# Patient Record
Sex: MALE | Race: WHITE | NOT HISPANIC OR LATINO
[De-identification: names, ages, dates, MRNs, and addresses within clinical notes are randomized per-mention and may not be internally consistent; named-entity substitution may affect disease eponyms.]

---

## 2018-12-10 PROBLEM — Z00.00 ENCOUNTER FOR PREVENTIVE HEALTH EXAMINATION: Status: ACTIVE | Noted: 2018-12-10

## 2018-12-28 ENCOUNTER — LABORATORY RESULT (OUTPATIENT)
Age: 43
End: 2018-12-28

## 2018-12-28 ENCOUNTER — APPOINTMENT (OUTPATIENT)
Dept: PULMONOLOGY | Facility: CLINIC | Age: 43
End: 2018-12-28
Payer: COMMERCIAL

## 2018-12-28 VITALS
OXYGEN SATURATION: 100 % | HEIGHT: 72 IN | HEART RATE: 64 BPM | RESPIRATION RATE: 17 BRPM | DIASTOLIC BLOOD PRESSURE: 65 MMHG | SYSTOLIC BLOOD PRESSURE: 110 MMHG | WEIGHT: 155 LBS | BODY MASS INDEX: 20.99 KG/M2

## 2018-12-28 DIAGNOSIS — R05 COUGH: ICD-10-CM

## 2018-12-28 DIAGNOSIS — R09.82 POSTNASAL DRIP: ICD-10-CM

## 2018-12-28 DIAGNOSIS — J30.2 OTHER SEASONAL ALLERGIC RHINITIS: ICD-10-CM

## 2018-12-28 DIAGNOSIS — Z78.9 OTHER SPECIFIED HEALTH STATUS: ICD-10-CM

## 2018-12-28 DIAGNOSIS — J30.9 ALLERGIC RHINITIS, UNSPECIFIED: ICD-10-CM

## 2018-12-28 DIAGNOSIS — Z86.39 PERSONAL HISTORY OF OTHER ENDOCRINE, NUTRITIONAL AND METABOLIC DISEASE: ICD-10-CM

## 2018-12-28 DIAGNOSIS — R06.02 SHORTNESS OF BREATH: ICD-10-CM

## 2018-12-28 DIAGNOSIS — Z80.0 FAMILY HISTORY OF MALIGNANT NEOPLASM OF DIGESTIVE ORGANS: ICD-10-CM

## 2018-12-28 DIAGNOSIS — Z83.79 FAMILY HISTORY OF OTHER DISEASES OF THE DIGESTIVE SYSTEM: ICD-10-CM

## 2018-12-28 DIAGNOSIS — K21.9 GASTRO-ESOPHAGEAL REFLUX DISEASE W/OUT ESOPHAGITIS: ICD-10-CM

## 2018-12-28 LAB
BASOPHILS # BLD AUTO: 0.02 K/UL
BASOPHILS NFR BLD AUTO: 0.3 %
EOSINOPHIL # BLD AUTO: 0.23 K/UL
EOSINOPHIL NFR BLD AUTO: 3.5 %
HCT VFR BLD CALC: 39.6 %
HGB BLD-MCNC: 12.5 G/DL
IMM GRANULOCYTES NFR BLD AUTO: 0.2 %
LYMPHOCYTES # BLD AUTO: 2 K/UL
LYMPHOCYTES NFR BLD AUTO: 30.3 %
MAN DIFF?: NORMAL
MCHC RBC-ENTMCNC: 26.8 PG
MCHC RBC-ENTMCNC: 31.6 GM/DL
MCV RBC AUTO: 85 FL
MONOCYTES # BLD AUTO: 0.68 K/UL
MONOCYTES NFR BLD AUTO: 10.3 %
NEUTROPHILS # BLD AUTO: 3.65 K/UL
NEUTROPHILS NFR BLD AUTO: 55.4 %
PLATELET # BLD AUTO: 288 K/UL
RBC # BLD: 4.66 M/UL
RBC # FLD: 15.7 %
WBC # FLD AUTO: 6.59 K/UL

## 2018-12-28 PROCEDURE — 94727 GAS DIL/WSHOT DETER LNG VOL: CPT

## 2018-12-28 PROCEDURE — 94729 DIFFUSING CAPACITY: CPT

## 2018-12-28 PROCEDURE — 99204 OFFICE O/P NEW MOD 45 MIN: CPT | Mod: 25

## 2018-12-28 PROCEDURE — 94010 BREATHING CAPACITY TEST: CPT

## 2018-12-28 PROCEDURE — 71046 X-RAY EXAM CHEST 2 VIEWS: CPT

## 2018-12-28 RX ORDER — ALBUTEROL SULFATE 90 UG/1
108 (90 BASE) AEROSOL, METERED RESPIRATORY (INHALATION) EVERY 6 HOURS
Qty: 1 | Refills: 3 | Status: ACTIVE | COMMUNITY
Start: 2018-12-28 | End: 1900-01-01

## 2018-12-28 RX ORDER — FLUTICASONE FUROATE AND VILANTEROL TRIFENATATE 200; 25 UG/1; UG/1
200-25 POWDER RESPIRATORY (INHALATION) DAILY
Qty: 1 | Refills: 2 | Status: ACTIVE | COMMUNITY
Start: 2018-12-28 | End: 1900-01-01

## 2018-12-28 RX ORDER — LEVOCETIRIZINE DIHYDROCHLORIDE 5 MG/1
5 TABLET ORAL
Qty: 30 | Refills: 0 | Status: ACTIVE | COMMUNITY
Start: 2018-12-14

## 2018-12-28 NOTE — PHYSICAL EXAM
[General Appearance - Well Developed] : well developed [Normal Appearance] : normal appearance [Well Groomed] : well groomed [General Appearance - Well Nourished] : well nourished [No Deformities] : no deformities [General Appearance - In No Acute Distress] : no acute distress [Normal Conjunctiva] : the conjunctiva exhibited no abnormalities [Eyelids - No Xanthelasma] : the eyelids demonstrated no xanthelasmas [Normal Oropharynx] : normal oropharynx [Neck Appearance] : the appearance of the neck was normal [Neck Cervical Mass (___cm)] : no neck mass was observed [Jugular Venous Distention Increased] : there was no jugular-venous distention [Thyroid Diffuse Enlargement] : the thyroid was not enlarged [Thyroid Nodule] : there were no palpable thyroid nodules [Heart Rate And Rhythm] : heart rate and rhythm were normal [Heart Sounds] : normal S1 and S2 [Murmurs] : no murmurs present [Respiration, Rhythm And Depth] : normal respiratory rhythm and effort [Exaggerated Use Of Accessory Muscles For Inspiration] : no accessory muscle use [Auscultation Breath Sounds / Voice Sounds] : lungs were clear to auscultation bilaterally [Abdomen Soft] : soft [Abdomen Tenderness] : non-tender [Abdomen Mass (___ Cm)] : no abdominal mass palpated [Abnormal Walk] : normal gait [Gait - Sufficient For Exercise Testing] : the gait was sufficient for exercise testing [Nail Clubbing] : no clubbing of the fingernails [Cyanosis, Localized] : no localized cyanosis [Petechial Hemorrhages (___cm)] : no petechial hemorrhages [Skin Color & Pigmentation] : normal skin color and pigmentation [Skin Turgor] : normal skin turgor [] : no rash [Deep Tendon Reflexes (DTR)] : deep tendon reflexes were 2+ and symmetric [Sensation] : the sensory exam was normal to light touch and pinprick [No Focal Deficits] : no focal deficits [Oriented To Time, Place, And Person] : oriented to person, place, and time [Impaired Insight] : insight and judgment were intact [Affect] : the affect was normal [II] : II [FreeTextEntry1] : I:E 1:3, clear

## 2018-12-28 NOTE — ASSESSMENT
[FreeTextEntry1] : Mr. Madrigal is a 43 year old male with a prior history of seasonal allergies, mild GERD who now comes in for a pulmonary evaluation.\par \par The patient's SOB is felt to be multifactorial:\par - Poor breathing mechanics\par - Mild, Intermittent Asthma\par \par DDx Cough\par - Asthma (Post URI Bronchospasm)\par - Secondary Reflux\par - PND syndrome \par \par Problem 1: Post URI Bronchospasm\par - Add Breo Ellipta 200 1 inhalatin QD\par - Add Ventolin 2 puffs prn, pre-exercise\par -Inhaler technique reviewed as well as oral hygiene techniques reviewed with patient. Avoidance of cold air, extremes of temperature, rescue inhaler should be used before exercise. Order of medication reviewed with patient. Recommended use of a cool mist humidifier in the bedroom. \par \par Problem 2: Allergy/ PND Syndrome \par - Recommended Xlear Natural Saline \par - Script given for blood work: asthma profile, food IgE panel, eosinophil level, IgE level, Vitamin D level \par - Environmental measures for allergies were encouraged including mattress and pillow cover, air purifier, and environmental controls.\par \par Problem 3: Secondary Reflux (Mild GERD)\par - Things to avoid including overeating, spicy foods, tight clothing, eating within three hours of bed, this list is not all inclusive. \par - For treatment of reflux, possible options discussed including diet control, H2 blockers, PPIs, as well as coating motility agents discussed as treatment options. Timing of meals and proximity of last meal to sleep were discussed. If symptoms persist, a formal gastrointestinal evaluation is needed.\par \par Problem 4: Poor Breathing Mechanics \par - Proper breathing techniques were reviewed with an emphasis of exhalation. Patient instructed to breath in for 1 second and out for four seconds. Patient was encouraged to not talk while walking.\par \par Problem 5: Health maintenance \par -s/p flu shot \par -recommended strep pneumonia vaccines: Prevnar-13 vaccine, followed by Pneumo vaccine 23 one year following\par -recommended early intervention for URIs\par -recommended regular osteoporosis evaluations\par -recommended early dermatological evaluations\par -recommended after the age of 50 to the age of 70, colonoscopy every 5 years\par \par \par f/u in 6-8 weeks\par pt is encouraged to call or fax the office with any questions or concerns. \par Explained to the pt in full detail with demonstrations how to use the inhalers and inhaler hygiene. \par -Education provided to the PT regarding their visit and conditions.

## 2018-12-28 NOTE — PROCEDURE
[FreeTextEntry1] : CXR revealed a normal sized heart; there was no evidence of infiltrate or effusion-- A normal chest radiograph. \par \par PFT- spi reveals normal flows; FEV1 was 3.87L which is 101% of predicted; normal lung volumes; normal diffusion at 28.7, which is 114% of predicted; normal flow volume loop

## 2018-12-28 NOTE — ADDENDUM
[FreeTextEntry1] : Documented by Abraham Berry acting as a scribe for Dr. Kong Torres on 12/28/18\par \par All medical record entries made by the Scribe were at my, Dr. Kong Torres's, direction and personally dictated by me on 12/28/18. I have reviewed the chart and agree that the record accurately reflects my personal performance of the history, physical exam, assessment and plan. I have also personally directed, reviewed, and agree with the discharge instructions. \par \par \par \par \par

## 2018-12-28 NOTE — HISTORY OF PRESENT ILLNESS
[FreeTextEntry1] : Mr. Madrigal is a 43 year old male coming into the office today for an initial evaluation. His chief complaint is cough. \par - He comes in stating that he developed a cold about 1-1.5 month ago. \par - He was placed on an Abx for his cold\par - He states that his congestion and sinus-related symptoms resolved, but he continues to have a persistent, intermittent cough, which he describes as "not deep"\par - As of a few days ago, he developed another cold\par - When he first developed his cough, he had hoarseness for about a week\par - He does note having had PND. \par - He has seasonal allergies late summer early fall. \par - He states that he has never had a cold that has persisted for this amount of time. \par - He states that he has a history of developing a cough if exercising in the cold air. \par - He states that he sometimes becomes SOB while climbing stairs\par - His weight is stable\par - He experiences occasional reflux, which he teats with Tums. \par - He had an endoscopy performed in the last few months, the results of which were normal. \par - His energy level is fine\par - He states that he does not get enough sleep, but he does not state that he feels tired in the morning\par - He denies snoring\par - In the last few months, he has developed right shoulder pain.\par - He notes a prior shoulder injury from a fall \par - He likes to run. \par - His bowels are regular\par - He denies any headaches, nausea, vomiting, fever, chills, sweats, chest pain, chest pressure, palpitations, wheezing, fatigue, diarrhea, constipation, dysphagia, myalgias, dizziness, leg swelling, leg pain, itchy eyes, itchy ears, or sour taste in the mouth.

## 2018-12-30 LAB
24R-OH-CALCIDIOL SERPL-MCNC: 63.6 PG/ML
25(OH)D3 SERPL-MCNC: 38 NG/ML
IGE SER-MCNC: 43 IU/ML

## 2019-01-02 ENCOUNTER — RESULT REVIEW (OUTPATIENT)
Age: 44
End: 2019-01-02

## 2019-01-03 LAB
A ALTERNATA IGE QN: <0.1 KUA/L
A FUMIGATUS IGE QN: <0.1 KUA/L
C ALBICANS IGE QN: 0.25 KUA/L
C HERBARUM IGE QN: <0.1 KUA/L
CAT DANDER IGE QN: <0.1 KUA/L
CLAM IGE QN: <0.1 KUA/L
CODFISH IGE QN: <0.1 KUA/L
COMMON RAGWEED IGE QN: 16.9 KUA/L
CORN IGE QN: <0.1 KUA/L
COW MILK IGE QN: <0.1 KUA/L
D FARINAE IGE QN: 0.14 KUA/L
D PTERONYSS IGE QN: <0.1 KUA/L
DEPRECATED A ALTERNATA IGE RAST QL: 0
DEPRECATED A FUMIGATUS IGE RAST QL: 0
DEPRECATED C ALBICANS IGE RAST QL: NORMAL
DEPRECATED C HERBARUM IGE RAST QL: 0
DEPRECATED CAT DANDER IGE RAST QL: 0
DEPRECATED CLAM IGE RAST QL: 0
DEPRECATED CODFISH IGE RAST QL: 0
DEPRECATED COMMON RAGWEED IGE RAST QL: ABNORMAL
DEPRECATED CORN IGE RAST QL: 0
DEPRECATED COW MILK IGE RAST QL: 0
DEPRECATED D FARINAE IGE RAST QL: NORMAL
DEPRECATED D PTERONYSS IGE RAST QL: 0
DEPRECATED DOG DANDER IGE RAST QL: 0
DEPRECATED EGG WHITE IGE RAST QL: 0
DEPRECATED M RACEMOSUS IGE RAST QL: 0
DEPRECATED PEANUT IGE RAST QL: 0
DEPRECATED ROACH IGE RAST QL: 0
DEPRECATED SCALLOP IGE RAST QL: <0.1 KUA/L
DEPRECATED SESAME SEED IGE RAST QL: 0
DEPRECATED SHRIMP IGE RAST QL: 0
DEPRECATED SOYBEAN IGE RAST QL: 0
DEPRECATED TIMOTHY IGE RAST QL: 0
DEPRECATED WALNUT IGE RAST QL: 0
DEPRECATED WHEAT IGE RAST QL: 0
DEPRECATED WHITE OAK IGE RAST QL: 0
DOG DANDER IGE QN: <0.1 KUA/L
EGG WHITE IGE QN: <0.1 KUA/L
M RACEMOSUS IGE QN: <0.1 KUA/L
PEANUT IGE QN: <0.1 KUA/L
ROACH IGE QN: <0.1 KUA/L
SCALLOP IGE QN: 0
SCALLOP IGE QN: <0.1 KUA/L
SESAME SEED IGE QN: <0.1 KUA/L
SOYBEAN IGE QN: <0.1 KUA/L
TIMOTHY IGE QN: <0.1 KUA/L
WALNUT IGE QN: <0.1 KUA/L
WHEAT IGE QN: <0.1 KUA/L
WHITE OAK IGE QN: <0.1 KUA/L

## 2019-02-04 ENCOUNTER — APPOINTMENT (OUTPATIENT)
Dept: PULMONOLOGY | Facility: CLINIC | Age: 44
End: 2019-02-04

## 2019-02-18 ENCOUNTER — CHART COPY (OUTPATIENT)
Age: 44
End: 2019-02-18

## 2022-09-09 ENCOUNTER — INPATIENT (INPATIENT)
Facility: HOSPITAL | Age: 47
LOS: 8 days | Discharge: HOME | DRG: 456 | End: 2022-09-18
Attending: INTERNAL MEDICINE | Admitting: HOSPITALIST
Payer: COMMERCIAL

## 2022-09-09 VITALS
RESPIRATION RATE: 18 BRPM | HEART RATE: 101 BPM | DIASTOLIC BLOOD PRESSURE: 70 MMHG | TEMPERATURE: 98 F | SYSTOLIC BLOOD PRESSURE: 90 MMHG

## 2022-09-09 DIAGNOSIS — M46.20 OSTEOMYELITIS OF VERTEBRA, SITE UNSPECIFIED: ICD-10-CM

## 2022-09-09 LAB
HCT VFR BLD CALC: 37.6 % — LOW (ref 39–50)
HGB BLD-MCNC: 11.9 G/DL — LOW (ref 13–17)
MCHC RBC-ENTMCNC: 28 PG — SIGNIFICANT CHANGE UP (ref 27–34)
MCHC RBC-ENTMCNC: 31.6 GM/DL — LOW (ref 32–36)
MCV RBC AUTO: 88.5 FL — SIGNIFICANT CHANGE UP (ref 80–100)
NRBC # BLD: 0 /100 WBCS — SIGNIFICANT CHANGE UP (ref 0–0)
PLATELET # BLD AUTO: 408 K/UL — HIGH (ref 150–400)
RBC # BLD: 4.25 M/UL — SIGNIFICANT CHANGE UP (ref 4.2–5.8)
RBC # FLD: 12.7 % — SIGNIFICANT CHANGE UP (ref 10.3–14.5)
WBC # BLD: 9.68 K/UL — SIGNIFICANT CHANGE UP (ref 3.8–10.5)
WBC # FLD AUTO: 9.68 K/UL — SIGNIFICANT CHANGE UP (ref 3.8–10.5)

## 2022-09-09 PROCEDURE — 93010 ELECTROCARDIOGRAM REPORT: CPT | Mod: 59

## 2022-09-09 PROCEDURE — 99285 EMERGENCY DEPT VISIT HI MDM: CPT

## 2022-09-09 PROCEDURE — 99222 1ST HOSP IP/OBS MODERATE 55: CPT

## 2022-09-09 NOTE — ED PROVIDER NOTE - OBJECTIVE STATEMENT
46F with 46F with no significant PMH who presents with epidural abscess. Patient was in his usual state of health until around 3.5 weeks ago when he presented to [facility] with syncope 46F with no significant PMH who presents with epidural abscess. Patient was in his usual state of health until around 3.5 weeks ago when he presented to [facility] with syncope, found to have MSSA bacteremia was given 2 days of IV vanc and started on IV daptomycin since Aug 17th. Follows with ID Dr. Krause  [phone] who reports that around a week ago patient started experiencing back and rib tightness and rising ESR/CRP to 60s and 30s respectively. Received a CTA and found to have developing paraspinal abscess at ventral margin of T8-T9 disc space and presented to ED for further evaluation. 46F with no significant PMH who presents with epidural abscess. Patient was in his usual state of health until around 3.5 weeks ago when he presented to [facility] with syncope, found to have MSSA bacteremia was given 2 days of IV vanc and started on IV daptomycin since Aug 17th. Follows with ID Dr. Krause  [phone] who reports that around a week ago patient started experiencing back and rib tightness and rising ESR/CRP to 60s and 30s respectively. Received a CTA and found to have developing paraspinal abscess at ventral margin of T8-T9 disc space and presented to ED for further evaluation.     Attending note.  Patient was seen in room #22 right.  Agree with above.  Patient was diagnosed by CT angio of a paraspinal abscess on the ventral margin of T8 and T9.  He denies any neurologic symptoms.  He has no bowel or bladder dysfunction, numbness or paresthesia or weakness.  Reports pain in the back and around the chest anteriorly which is worse with movement and deep inspiration.  He denies any fever.

## 2022-09-09 NOTE — CONSULT NOTE ADULT - TIME BILLING
Please note that over 50 minutes of time was spent in care of this patient including  - pre visit preparation  - in person visit  - post visit documentation  - review of imaging  - discussion with colleagues

## 2022-09-09 NOTE — ED PROVIDER NOTE - PROGRESS NOTE DETAILS
called ortho-spine and ordered MR c-spine, thoracic and lumbar spine with and wo contrast consulted ID per hospital medicine request, fellow on call Dr. Yanes recommended continuing IV daptomycin, source control, formal consult to follow in morning

## 2022-09-09 NOTE — ED PROVIDER NOTE - CLINICAL SUMMARY MEDICAL DECISION MAKING FREE TEXT BOX
46M with recent hx of MSSA bacteremia on IV daptomycin since Aug 17 who presents with new paraspinal abscess at T8-T9. Will consult ortho-spine and order MRI c-spine, thoracic, and lumbar spine. 46M with recent hx of MSSA bacteremia on IV daptomycin since Aug 17 who presents with new paraspinal abscess at T8-T9. Will consult ortho-spine and order MRI c-spine, thoracic, and lumbar spine.     Attn - Paraspinal abscess seen on CT angio.  Neurosurgical consultation, labs, cultures, admit.

## 2022-09-09 NOTE — CONSULT NOTE ADULT - ATTENDING COMMENTS
This is a 46 year old male with ~1M of mid back pain and bacteremia. He also describes radiating pain across back. Imaging shows progression of destructive process at T8-9. He is NV intact with no UMN signs. We will need MRI C/T/L spine w and wo contrast for full evaluation of this area. Will try to get this completed ASAP

## 2022-09-09 NOTE — ED PROVIDER NOTE - PHYSICAL EXAMINATION
Attn - alert, NAD, no pallor or jaundice, PERRL 3 mm, moist mm, skin - warm and dry, Lungs - clear, no w/r/r, good BS bilaterally, Cor - rr, no M, no rub, Abdo - ND, soft, NT, no HSM, no CVAT, no guarding or rebound. Back - nontender to percussion,  Extremities - no edema, no calf tenderness, distal pulses intact and symmetrical, Neuro - intact and non-focal

## 2022-09-09 NOTE — CONSULT NOTE ADULT - SUBJECTIVE AND OBJECTIVE BOX
[facility]    Patient is a 46yMale who presents to [facility] ED w/ a c/o of Thoracic back pain for 3 weeks. Patient states hx of recent MRSA bacteremia being treated with Daptomycin outpatient until 9/17/22. He states he has been having thoracic back pain since that time. Had fevers/chills which have resolved since antibiotics started. Vague back pain remains but he is able to ambulate without any radicular pains or weakness. No bowel or bladder symptoms or saddle anesthesia. He had an outpatient MRI T Spine w contrast 8/22/22 which showed T8-9 HNP and CT Chest w/ contrast shows new T8-9 epidural abscess with lytic changes. Denies any numbness or tingling. Denies having any other pain elsewhere. No other orthopedic concerns at this time.      No Known Allergies      PHYSICAL EXAM:  T(C): 37.1 (09-09-22 @ 20:20), Max: 37.1 (09-09-22 @ 20:20)  HR: 98 (09-09-22 @ 20:20) (98 - 101)  BP: 106/70 (09-09-22 @ 20:20) (90/70 - 106/70)  RR: 18 (09-09-22 @ 20:20) (18 - 18)  SpO2: 97% (09-09-22 @ 20:20) (97% - 97%)      GEN: NAD, AAOx3    SPINE:  Skin intact  NTTP over the bony prominences of the cervical // thoracic // lumbar // sacral spine  No bony step-offs  Grossly moving all extremities  + Median/Radial/Ulnar/Musculocutaneous/Axillary nerves intact  + Hip Flexors/Quads/Hamstrings/TA/EHL/FHL/GS  SILT C5-T1  SILT L2-S1  + Radial Pulse  + DP/PT Pulses  No saddle anesthesia  + rectal tone      Motor:                          Deltoid       Biceps      Triceps      Wrist Ext      Finger Flex    Finger Abduction   RIGHT             5/5             5/5             5/5             5/5                 5/5                     5/5  LEFT                5/5             5/5             5/5             5/5                 5/5                     5/5                          Hip Flex       Knee Ext        Knee Flex     Dorsiflex      Hallux Ext        PlantarFlex  RIGHT            5/5                5/5                 5/5               5/5                 5/5                    5/5  LEFT               5/5                5/5                 5/5               5/5                 5/5                    5/5      Sensory:                      C5      C6      C7      C8       T1          RIGHT          2         2        2         2         2          (0=absent, 1=impaired, 2=normal, NT=not testable)  LEFT             2         2        2         2         2          (0=absent, 1=impaired, 2=normal, NT=not testable)                        L2        L3       L4      L5       S1          RIGHT        2          2         2        2        2           (0=absent, 1=impaired, 2=normal, NT=not testable)  LEFT           2          2         2        2        2           (0=absent, 1=impaired, 2=normal, NT=not testable)    Negative Hoang's sign bilaterally  Negative Babinski bilaterally   Negative Myoclonus bilaterally        Secondary Survey:   No TTP over bony prominences, SILT, palpable pulses, full/painless A/PROM, compartments soft. No TTP over spinous processes or paraspinal muscles at C/T/L spine. No palpable step off. No other injuries or complaints.      A/P: 46M w/ T8-9 changes on outpatient CT chest consistent with epidural abscess, OM    Imaging reviewed  Recommend MRI CTL Spine w/wo con to r/o skip lesions  ID C/s   Analgesia  WBAT  DVT ppx per primary  PT/OT   Further ortho spine recs pending MR Imaging

## 2022-09-09 NOTE — ED ADULT NURSE NOTE - OBJECTIVE STATEMENT
pt had ct today and revealed Epidural spinal abscess at T8T9;  pt is being treated for bacteremia since August following a syncopal episode; received daily iv antibiotics peripheral; c/o back and rib pain;; pt had ct today and revealed Epidural spinal abscess at T8T9;  pt is being treated for bacteremia since August following a syncopal episode; received daily iv antibiotics peripheral (Daptomycin daily at 0900 at outpt clinic); c/o back and rib pain; pt sent here for MRI

## 2022-09-10 ENCOUNTER — TRANSCRIPTION ENCOUNTER (OUTPATIENT)
Age: 47
End: 2022-09-10

## 2022-09-10 DIAGNOSIS — D64.9 ANEMIA, UNSPECIFIED: ICD-10-CM

## 2022-09-10 DIAGNOSIS — M46.20 OSTEOMYELITIS OF VERTEBRA, SITE UNSPECIFIED: ICD-10-CM

## 2022-09-10 DIAGNOSIS — Z87.898 PERSONAL HISTORY OF OTHER SPECIFIED CONDITIONS: ICD-10-CM

## 2022-09-10 DIAGNOSIS — Z01.818 ENCOUNTER FOR OTHER PREPROCEDURAL EXAMINATION: ICD-10-CM

## 2022-09-10 DIAGNOSIS — R63.8 OTHER SYMPTOMS AND SIGNS CONCERNING FOOD AND FLUID INTAKE: ICD-10-CM

## 2022-09-10 LAB
ALBUMIN SERPL ELPH-MCNC: 3.7 G/DL — SIGNIFICANT CHANGE UP (ref 3.3–5)
ALP SERPL-CCNC: 98 U/L — SIGNIFICANT CHANGE UP (ref 40–120)
ALT FLD-CCNC: 20 U/L — SIGNIFICANT CHANGE UP (ref 10–45)
ANION GAP SERPL CALC-SCNC: 10 MMOL/L — SIGNIFICANT CHANGE UP (ref 5–17)
ANION GAP SERPL CALC-SCNC: 12 MMOL/L — SIGNIFICANT CHANGE UP (ref 5–17)
APTT BLD: 29.7 SEC — SIGNIFICANT CHANGE UP (ref 27.5–35.5)
AST SERPL-CCNC: 17 U/L — SIGNIFICANT CHANGE UP (ref 10–40)
BILIRUB SERPL-MCNC: 0.5 MG/DL — SIGNIFICANT CHANGE UP (ref 0.2–1.2)
BLD GP AB SCN SERPL QL: NEGATIVE — SIGNIFICANT CHANGE UP
BUN SERPL-MCNC: 15 MG/DL — SIGNIFICANT CHANGE UP (ref 7–23)
BUN SERPL-MCNC: 17 MG/DL — SIGNIFICANT CHANGE UP (ref 7–23)
CALCIUM SERPL-MCNC: 9.3 MG/DL — SIGNIFICANT CHANGE UP (ref 8.4–10.5)
CALCIUM SERPL-MCNC: 9.8 MG/DL — SIGNIFICANT CHANGE UP (ref 8.4–10.5)
CHLORIDE SERPL-SCNC: 101 MMOL/L — SIGNIFICANT CHANGE UP (ref 96–108)
CHLORIDE SERPL-SCNC: 104 MMOL/L — SIGNIFICANT CHANGE UP (ref 96–108)
CK SERPL-CCNC: 52 U/L — SIGNIFICANT CHANGE UP (ref 30–200)
CO2 SERPL-SCNC: 24 MMOL/L — SIGNIFICANT CHANGE UP (ref 22–31)
CO2 SERPL-SCNC: 26 MMOL/L — SIGNIFICANT CHANGE UP (ref 22–31)
CREAT SERPL-MCNC: 0.84 MG/DL — SIGNIFICANT CHANGE UP (ref 0.5–1.3)
CREAT SERPL-MCNC: 0.92 MG/DL — SIGNIFICANT CHANGE UP (ref 0.5–1.3)
CRP SERPL-MCNC: 40 MG/L — HIGH (ref 0–4)
EGFR: 103 ML/MIN/1.73M2 — SIGNIFICANT CHANGE UP
EGFR: 108 ML/MIN/1.73M2 — SIGNIFICANT CHANGE UP
ERYTHROCYTE [SEDIMENTATION RATE] IN BLOOD: 84 MM/HR — HIGH (ref 0–15)
FLUAV AG NPH QL: SIGNIFICANT CHANGE UP
FLUBV AG NPH QL: SIGNIFICANT CHANGE UP
GLUCOSE SERPL-MCNC: 91 MG/DL — SIGNIFICANT CHANGE UP (ref 70–99)
GLUCOSE SERPL-MCNC: 94 MG/DL — SIGNIFICANT CHANGE UP (ref 70–99)
HCT VFR BLD CALC: 34.6 % — LOW (ref 39–50)
HGB BLD-MCNC: 11.2 G/DL — LOW (ref 13–17)
INR BLD: 1.23 RATIO — HIGH (ref 0.88–1.16)
MAGNESIUM SERPL-MCNC: 2.1 MG/DL — SIGNIFICANT CHANGE UP (ref 1.6–2.6)
MCHC RBC-ENTMCNC: 28.4 PG — SIGNIFICANT CHANGE UP (ref 27–34)
MCHC RBC-ENTMCNC: 32.4 GM/DL — SIGNIFICANT CHANGE UP (ref 32–36)
MCV RBC AUTO: 87.8 FL — SIGNIFICANT CHANGE UP (ref 80–100)
NRBC # BLD: 0 /100 WBCS — SIGNIFICANT CHANGE UP (ref 0–0)
PHOSPHATE SERPL-MCNC: 3.2 MG/DL — SIGNIFICANT CHANGE UP (ref 2.5–4.5)
PLATELET # BLD AUTO: 318 K/UL — SIGNIFICANT CHANGE UP (ref 150–400)
POTASSIUM SERPL-MCNC: 3.6 MMOL/L — SIGNIFICANT CHANGE UP (ref 3.5–5.3)
POTASSIUM SERPL-MCNC: 3.9 MMOL/L — SIGNIFICANT CHANGE UP (ref 3.5–5.3)
POTASSIUM SERPL-SCNC: 3.6 MMOL/L — SIGNIFICANT CHANGE UP (ref 3.5–5.3)
POTASSIUM SERPL-SCNC: 3.9 MMOL/L — SIGNIFICANT CHANGE UP (ref 3.5–5.3)
PROT SERPL-MCNC: 7.3 G/DL — SIGNIFICANT CHANGE UP (ref 6–8.3)
PROTHROM AB SERPL-ACNC: 14.3 SEC — HIGH (ref 10.5–13.4)
RBC # BLD: 3.94 M/UL — LOW (ref 4.2–5.8)
RBC # FLD: 12.6 % — SIGNIFICANT CHANGE UP (ref 10.3–14.5)
RH IG SCN BLD-IMP: POSITIVE — SIGNIFICANT CHANGE UP
RSV RNA NPH QL NAA+NON-PROBE: SIGNIFICANT CHANGE UP
SARS-COV-2 RNA SPEC QL NAA+PROBE: SIGNIFICANT CHANGE UP
SODIUM SERPL-SCNC: 138 MMOL/L — SIGNIFICANT CHANGE UP (ref 135–145)
SODIUM SERPL-SCNC: 139 MMOL/L — SIGNIFICANT CHANGE UP (ref 135–145)
WBC # BLD: 7.85 K/UL — SIGNIFICANT CHANGE UP (ref 3.8–10.5)
WBC # FLD AUTO: 7.85 K/UL — SIGNIFICANT CHANGE UP (ref 3.8–10.5)

## 2022-09-10 PROCEDURE — 72157 MRI CHEST SPINE W/O & W/DYE: CPT | Mod: 26

## 2022-09-10 PROCEDURE — 99223 1ST HOSP IP/OBS HIGH 75: CPT

## 2022-09-10 PROCEDURE — 71045 X-RAY EXAM CHEST 1 VIEW: CPT | Mod: 26

## 2022-09-10 PROCEDURE — 72156 MRI NECK SPINE W/O & W/DYE: CPT | Mod: 26

## 2022-09-10 PROCEDURE — 72158 MRI LUMBAR SPINE W/O & W/DYE: CPT | Mod: 26

## 2022-09-10 RX ORDER — CYCLOBENZAPRINE HYDROCHLORIDE 10 MG/1
10 TABLET, FILM COATED ORAL AT BEDTIME
Refills: 0 | Status: DISCONTINUED | OUTPATIENT
Start: 2022-09-10 | End: 2022-09-11

## 2022-09-10 RX ORDER — DAPTOMYCIN 500 MG/10ML
700 INJECTION, POWDER, LYOPHILIZED, FOR SOLUTION INTRAVENOUS EVERY 24 HOURS
Refills: 0 | Status: DISCONTINUED | OUTPATIENT
Start: 2022-09-10 | End: 2022-09-11

## 2022-09-10 RX ORDER — DAPTOMYCIN 500 MG/10ML
500 INJECTION, POWDER, LYOPHILIZED, FOR SOLUTION INTRAVENOUS EVERY 24 HOURS
Refills: 0 | Status: DISCONTINUED | OUTPATIENT
Start: 2022-09-10 | End: 2022-09-10

## 2022-09-10 RX ORDER — ENOXAPARIN SODIUM 100 MG/ML
40 INJECTION SUBCUTANEOUS EVERY 24 HOURS
Refills: 0 | Status: DISCONTINUED | OUTPATIENT
Start: 2022-09-10 | End: 2022-09-10

## 2022-09-10 RX ORDER — ACETAMINOPHEN 500 MG
650 TABLET ORAL EVERY 6 HOURS
Refills: 0 | Status: DISCONTINUED | OUTPATIENT
Start: 2022-09-10 | End: 2022-09-11

## 2022-09-10 RX ORDER — SODIUM CHLORIDE 9 MG/ML
1000 INJECTION INTRAMUSCULAR; INTRAVENOUS; SUBCUTANEOUS
Refills: 0 | Status: DISCONTINUED | OUTPATIENT
Start: 2022-09-10 | End: 2022-09-11

## 2022-09-10 RX ADMIN — CYCLOBENZAPRINE HYDROCHLORIDE 10 MILLIGRAM(S): 10 TABLET, FILM COATED ORAL at 22:05

## 2022-09-10 RX ADMIN — SODIUM CHLORIDE 75 MILLILITER(S): 9 INJECTION INTRAMUSCULAR; INTRAVENOUS; SUBCUTANEOUS at 19:31

## 2022-09-10 RX ADMIN — DAPTOMYCIN 120 MILLIGRAM(S): 500 INJECTION, POWDER, LYOPHILIZED, FOR SOLUTION INTRAVENOUS at 10:24

## 2022-09-10 NOTE — H&P ADULT - PROBLEM SELECTOR PLAN 2
MSSA bacteremia tx at [facility]   TTE negative, no clear source at that time, now currently tx for paraspinal abscess  -Continue IV daptomycin 500 mg daily   -ID consult in AM   -F/u blood cultures

## 2022-09-10 NOTE — CONSULT NOTE ADULT - ASSESSMENT
46 year old M with MSSA bacteremia who presented to the ED with back pain for the last week or so, was found to have paraspinal T8/T9 abscess on outpatient CT.     Presented to Health system in August, pt was found to have (likely) MRSA bacteremia. TTE per report was negative. No source was found. Pt was discharged on a 4 week course of Daptomycin 500mg IV QD via PICC through 9/17.     Since last week, pt had been having back pain. Saw ID outpatient, pt was found to have elevated ESR and CRP and CT was done which showed above mentioned para spinal abscess.     WORKUP  Blood cultures pending   MRI spine pending   CRP 40    DIAGNOSIS and IMPRESSION  #Recent MRSA bacteremia   #Spinal abscess T8/T9 on outpatient CT    RECOMMENDATIONS  -Check CPK  -Continue Daptomycin (will discuss with Attending if dosage needs to be adjusted, currently on ~7cc/kg of Daptomycin).   -F/u blood cultures  -F/u MRI    All recommendations are tentative pending Attending Attestation.    Ritika Yanes MD, PGY4   ID Fellow  [employer] Teams Preferred  After 5pm/weekends call [phone]   46 year old M with MSSA bacteremia who presented to the ED with back pain for the last week or so, was found to have paraspinal T8/T9 abscess on outpatient CT.     Presented to [facility] in August, pt was found to have (likely) MRSA bacteremia. TTE per report was negative. No source was found. Pt was discharged on a 4 week course of Daptomycin 500mg IV QD via PICC through 9/17.     Since last week, pt had been having back pain. Saw ID outpatient, pt was found to have elevated ESR and CRP and CT was done which showed above mentioned para spinal abscess.     WORKUP  Blood cultures pending   MRI spine pending   CRP 40    DIAGNOSIS and IMPRESSION  #Recent MRSA bacteremia   #Spinal abscess T8/T9 on outpatient CT    RECOMMENDATIONS  -Check CPK  -Continue Daptomycin (will discuss with Attending if dosage needs to be adjusted, currently on ~7cc/kg of Daptomycin).   -F/u blood cultures  -F/u MRI  -Ortho Spine on board, f/u recommendations, tentative plan for OR 9/10 pending MRI    All recommendations are tentative pending Attending Attestation.    Ritika Yanes MD, PGY4   ID Fellow  [employer] Teams Preferred  After 5pm/weekends call [phone]   46 year old M with MSSA bacteremia who presented to the ED with back pain for the last week or so, was found to have paraspinal T8/T9 abscess on outpatient CT.     Presented to [facility] in August, pt was found to have (likely) MRSA bacteremia. TTE per report was negative. No source was found. Pt was discharged on a 4 week course of Daptomycin 500mg IV QD via PICC through 9/17.     Since last week, pt had been having back pain. Saw ID outpatient, pt was found to have elevated ESR and CRP and CT was done which showed above mentioned para spinal abscess.     WORKUP  Blood cultures pending   MRI spine pending   CRP 40    DIAGNOSIS and IMPRESSION  #Recent MRSA bacteremia   #Spinal abscess T8/T9 on outpatient CT    RECOMMENDATIONS  -Check CPK  -Continue Daptomycin - please increase to 700mg IV QD (10cc/kg) given bacteremia and now abscess.   -F/u blood cultures  -F/u MRI  -Ortho Spine on board, f/u recommendations, tentative plan for OR 9/10 pending MRI    All recommendations are tentative pending Attending Attestation.    Ritika Yanes MD, PGY4   ID Fellow  [employer] Teams Preferred  After 5pm/weekends call [phone]   46 m with staph aureus bacteremia 8/2022 at [facility], was discharged with dapto to complete the course 9/17 but then developed mid back pain and CT was done which showed T8-T9 abscess so pt was sent to the ED   afebrile, normal WBC: ESR: 89, CRP: 40    recent staph aureus bactermia likely MRSA on dapto to complete the course on 9/17 ( no source identified) now with back pain and T8-T9 abscess on outpatient CT    * blood cx  * thoracic and LS spine MRI  * increase dapto to 700 qd  * monitor CK  * f/u with spine        The above assessment and plan was discussed with the primary team    Steff Fitzgerald MD  contact on teams  After 5pm and on weekends call [phone]

## 2022-09-10 NOTE — CONSULT NOTE ADULT - SUBJECTIVE AND OBJECTIVE BOX
Patient is a 47 yo M who presents with a chief complaint of paraspinal abscess on CT (10 Sep 2022 09:09)    HPI:  46 year old M with MSSA bacteremia who presented to the ED with back pain for the last week or so, was found to have paraspinal T8/T9 abscess on outpatient CT.     Presented to [facility] in August, pt was found to have (likely) MRSA bacteremia. TTE per report was negative. No source was found. Pt was discharged on a 4 week course of Daptomycin 500mg IV QD via PICC through 9/17.     Since last week, pt had been having back pain. Saw ID outpatient, pt was found to have elevated ESR and CRP and CT was done which showed above mentioned para spinal abscess.     PMH:  PCP- Dr. Oracio Bhakta   ID: Dr. Krause  [phone]     Recent ANA in Aug 22: Summary:  1. Left ventricle: The cavity size is normal. Left ventricular ejection     fraction is 55-60%.  2. Right ventricle: The cavity size is normal. Right ventricular systolic     function is normal.  3. Left atrium: There is no thrombus in the left atrium or the left atrial     appendage. There is no evidence of a patent foramen ovale by color     Doppler and agitated saline interrogation of the interatrial septum.  4. Mitral valve: There is no evidence of mitral valve regurgitation.  5. Aortic valve: There is no valvular aortic regurgitation.  6. Tricuspid valve: There is no tricuspid valve regurgitation.  7. Pericardium, extracardiac: There is no pericardial effusion.  8. No masses were present, suggestive of endocarditis.    REVIEW OF SYSTEMS  Constitutional: No fevers, chills, weight loss or fatigue   Skin: No rash, no phlebitis	  Eyes: No discharge	  ENMT: No sore throat, oral thrush, ulcers or exudate  Respiratory: No cough, no SOB  Cardiovascular:  No chest pain, palpitations or edema   Gastrointestinal: No pain, nausea, vomiting, diarrhea or constipation	  Genitourinary: No dysuria, discharge or flank pain  MSK: + Back pain   Neurological: No HA, no weakness, no seizures, no AMS     prior hospital charts reviewed [V]  primary team notes reviewed [V]  other consultant notes reviewed [V]    PAST MEDICAL & SURGICAL HISTORY:  Syncope    MSSA bacteremia    No significant past surgical history    SOCIAL HISTORY:  - Denied smoking/vaping/alcohol/recreational drug use    FAMILY HISTORY:  Family history of thyroid cancer (Mother, Sibling)    Family history of malignant neoplasm of kidney (Sibling)    Family history of pancreatic cancer (Sibling)    Allergies  No Known Allergies    ANTIMICROBIALS:  DAPTOmycin IVPB 500 every 24 hours    ANTIMICROBIALS (past 90 days):  MEDICATIONS  (STANDING):    OTHER MEDS:   MEDICATIONS  (STANDING):  acetaminophen     Tablet .. 650 every 6 hours PRN  cyclobenzaprine 10 at bedtime    VITALS:  Vital Signs Last 24 Hrs  T(F): 98.4 (09-10-22 @ 06:56), Max: 98.7 (09-09-22 @ 20:20)    Vital Signs Last 24 Hrs  HR: 89 (09-10-22 @ 06:56) (89 - 101)  BP: 105/71 (09-10-22 @ 06:56) (90/70 - 112/89)  RR: 18 (09-10-22 @ 06:56)  SpO2: 98% (09-10-22 @ 06:56) (97% - 99%)  Wt(kg): --    EXAM:  General: Patient in no acute distress   HEENT: NCAT, EOMI, PERRL, no oral lesions  CV: S1+S2, no m/r/g appreciated   Lungs: No respiratory distress, CTAB  Abd: Soft, nontender, no guarding, no rebound tenderness, + bowel sounds   Ext: No cyanosis, no edema  Neuro: Alert and oriented, no focal deficits, CN II-XII grossly intact   No spinal point tenderness, no bowel or bladder deficits, no weakness or sensory deficits noted on gross exam.   Skin: No rash   IV: No phlebitis    Labs:                        11.2   7.85  )-----------( 318      ( 10 Sep 2022 07:19 )             34.6     09-10    138  |  104  |  15  ----------------------------<  94  3.9   |  24  |  0.84    Ca    9.3      10 Sep 2022 07:21  Phos  3.2     09-10  Mg     2.1     09-10    TPro  7.3  /  Alb  3.7  /  TBili  0.5  /  DBili  x   /  AST  17  /  ALT  20  /  AlkPhos  98  09-10    WBC Trend:  WBC Count: 7.85 (09-10-22 @ 07:19)  WBC Count: 9.68 (09-09-22 @ 23:27)    Creatine Trend:  Creatinine, Serum: 0.84 (09-10)  Creatinine, Serum: 0.92 (09-09)    Liver Biochemical Testing Trend:  Alanine Aminotransferase (ALT/SGPT): 20 (09-10)  Aspartate Aminotransferase (AST/SGOT): 17 (09-10-22 @ 07:21)  Bilirubin Total, Serum: 0.5 (09-10)    Trend LDH    MICROBIOLOGY:    C-Reactive Protein, Serum: 40 (09-09)    RADIOLOGY:  imaging below personally reviewed Patient is a 47 yo M who presents with a chief complaint of paraspinal abscess on CT (10 Sep 2022 09:09)    HPI:  46 year old M with MSSA bacteremia who presented to the ED with back pain for the last week or so, was found to have paraspinal T8/T9 abscess on outpatient CT.     Presented to [facility] in August, pt was found to have (likely) MRSA bacteremia. TTE per report was negative. No source was found. Pt was discharged on a 4 week course of Daptomycin 500mg IV QD via PICC through 9/17.     Since last week, pt had been having back pain. Saw ID outpatient, pt was found to have elevated ESR and CRP and CT was done which showed above mentioned para spinal abscess.     PMH:  PCP- Dr. Oracio Bhakta   ID: Dr. Krause  [phone]     Recent ANA in Aug 22: Summary:  1. Left ventricle: The cavity size is normal. Left ventricular ejection     fraction is 55-60%.  2. Right ventricle: The cavity size is normal. Right ventricular systolic     function is normal.  3. Left atrium: There is no thrombus in the left atrium or the left atrial     appendage. There is no evidence of a patent foramen ovale by color     Doppler and agitated saline interrogation of the interatrial septum.  4. Mitral valve: There is no evidence of mitral valve regurgitation.  5. Aortic valve: There is no valvular aortic regurgitation.  6. Tricuspid valve: There is no tricuspid valve regurgitation.  7. Pericardium, extracardiac: There is no pericardial effusion.  8. No masses were present, suggestive of endocarditis.    REVIEW OF SYSTEMS  Constitutional: No fevers, chills, weight loss or fatigue   Skin: No rash, no phlebitis	  Eyes: No discharge	  ENMT: No sore throat, oral thrush, ulcers or exudate  Respiratory: No cough, no SOB  Cardiovascular:  No chest pain, palpitations or edema   Gastrointestinal: No pain, nausea, vomiting, diarrhea or constipation	  Genitourinary: No dysuria, discharge or flank pain  MSK: + Back pain   Neurological: No HA, no weakness, no seizures, no AMS   psych: no anxiety  prior hospital charts reviewed [V]  primary team notes reviewed [V]  other consultant notes reviewed [V]    PAST MEDICAL & SURGICAL HISTORY:  Syncope    MSSA bacteremia    No significant past surgical history    SOCIAL HISTORY:  no smoking/vaping/alcohol/recreational drug use  no recent travel  FAMILY HISTORY:  Family history of thyroid cancer (Mother, Sibling)    Family history of malignant neoplasm of kidney (Sibling)    Family history of pancreatic cancer (Sibling)    Allergies  No Known Allergies    ANTIMICROBIALS:  DAPTOmycin IVPB 500 every 24 hours    ANTIMICROBIALS (past 90 days):  MEDICATIONS  (STANDING):    OTHER MEDS:   MEDICATIONS  (STANDING):  acetaminophen     Tablet .. 650 every 6 hours PRN  cyclobenzaprine 10 at bedtime    VITALS:  Vital Signs Last 24 Hrs  T(F): 98.4 (09-10-22 @ 06:56), Max: 98.7 (09-09-22 @ 20:20)    Vital Signs Last 24 Hrs  HR: 89 (09-10-22 @ 06:56) (89 - 101)  BP: 105/71 (09-10-22 @ 06:56) (90/70 - 112/89)  RR: 18 (09-10-22 @ 06:56)  SpO2: 98% (09-10-22 @ 06:56) (97% - 99%)  Wt(kg): --    EXAM:  General: Patient in no acute distress   Head: atraumatic, normocephalic  Eye: normal sclera  ENT:  no oral lesions  CV: S1+S2, no m/r/g appreciated   Lungs: No respiratory distress, CTAB  Abd: Soft, nontender, no guarding, no rebound tenderness, + bowel sounds   : no suprapubic or CVA tenderness  Ext: No cyanosis, no edema  Neuro: Alert and oriented, no focal deficits, CN II-XII grossly intact   No spinal point tenderness, no bowel or bladder deficits, no weakness or sensory deficits noted on gross exam.   Skin: No rash   vascular: No phlebitis  psych: normal affect  Labs:                        11.2   7.85  )-----------( 318      ( 10 Sep 2022 07:19 )             34.6     09-10    138  |  104  |  15  ----------------------------<  94  3.9   |  24  |  0.84    Ca    9.3      10 Sep 2022 07:21  Phos  3.2     09-10  Mg     2.1     09-10    TPro  7.3  /  Alb  3.7  /  TBili  0.5  /  DBili  x   /  AST  17  /  ALT  20  /  AlkPhos  98  09-10    WBC Trend:  WBC Count: 7.85 (09-10-22 @ 07:19)  WBC Count: 9.68 (09-09-22 @ 23:27)    Creatine Trend:  Creatinine, Serum: 0.84 (09-10)  Creatinine, Serum: 0.92 (09-09)    Liver Biochemical Testing Trend:  Alanine Aminotransferase (ALT/SGPT): 20 (09-10)  Aspartate Aminotransferase (AST/SGOT): 17 (09-10-22 @ 07:21)  Bilirubin Total, Serum: 0.5 (09-10)    Trend LDH    MICROBIOLOGY:    C-Reactive Protein, Serum: 40 (09-09)    RADIOLOGY:  outpatient CT s/o T8/T9 abscess

## 2022-09-10 NOTE — H&P ADULT - ASSESSMENT
46M with PMH: presents to the ED with complaints of back and rib tightness since 1 week. Patient was diagnosed by CT angio of a paraspinal abscess on the ventral margin of T8 and T9.    Admitted for management of paraspinal abscess  Ortho spine following, ID contacted by ED, will need formal consult in AM  46M with PMH: recent syncope and MSSA bacteremia presents to the ED with complaints of persistent back and rib tightness since 1 week. Patient was found to have paraspinal abscess on outpatient CT on the ventral margin of T8 and T9.    Admitted for management of paraspinal abscess  Ortho spine following, ID contacted by ED, will need formal consult in AM

## 2022-09-10 NOTE — PROGRESS NOTE ADULT - ASSESSMENT
46M with PMH: recent syncope and MSSA ?bacteremia, currently receiving IV daptomycin EOD 09/17 presents to the ED with complaints of persistent back and rib tightness since 1 week. Patient was found to have paraspinal abscess on outpatient CT on the ventral margin of T8 and T9.Admitted for management of paraspinal abscess , Ortho and ID started following.

## 2022-09-10 NOTE — H&P ADULT - PROBLEM SELECTOR PLAN 1
detected on outpatient CT imaging  -ID recs appreciated, will continue IV daptomycin 500 mg daily for now, needs formal consult in AM   -Ortho spine recs appreciated- will obtain MRI spine (ordered)   -F/u blood cultures  -Tylenol PRN for back pain  -PT eval when appropriate  -Obtain outpatient CT records

## 2022-09-10 NOTE — PATIENT PROFILE ADULT - FALL HARM RISK - HARM RISK INTERVENTIONS

## 2022-09-10 NOTE — H&P ADULT - PROBLEM SELECTOR PLAN 4
F: not indicated  E: replete PRN   N: regular diet   DVT ppx: with lovenox    Full Code    Dispo --> [facility]

## 2022-09-10 NOTE — PROGRESS NOTE ADULT - PROBLEM SELECTOR PLAN 1
detected on outpatient CT imaging  -ID recs appreciated, will continue IV daptomycin 700 mg daily for now  -Ortho spine recs appreciated-       # OR surgery pending for MRI spine   -F/u blood cultures  -Tylenol PRN for back pain  -PT eval when appropriate  -Obtain outpatient CT records

## 2022-09-10 NOTE — H&P ADULT - NSHPPHYSICALEXAM_GEN_ALL_CORE
VITALS:   T(C): 37.1 (09-09-22 @ 20:20), Max: 37.1 (09-09-22 @ 20:20)  HR: 98 (09-09-22 @ 20:20) (98 - 101)  BP: 106/70 (09-09-22 @ 20:20) (90/70 - 106/70)  RR: 18 (09-09-22 @ 20:20) (18 - 18)  SpO2: 97% (09-09-22 @ 20:20) (97% - 97%)    GENERAL: NAD, lying in bed comfortably  HEAD:  Atraumatic, Normocephalic  EYES: EOMI, PERRLA, conjunctiva and sclera clear  ENT: Moist mucous membranes  NECK: Supple, No JVD  CHEST/LUNG: Clear to auscultation bilaterally; No rales, rhonchi, wheezing, or rubs. Unlabored respirations  HEART: Regular rate and rhythm; No murmurs, rubs, or gallops  ABDOMEN: BSx4; Soft, nontender, nondistended  EXTREMITIES:  2+ Peripheral Pulses, brisk capillary refill. No clubbing, cyanosis, or edema  NERVOUS SYSTEM:  A&Ox3, no focal deficits   SKIN: No rashes or lesions VITALS:   T(C): 37.1 (09-09-22 @ 20:20), Max: 37.1 (09-09-22 @ 20:20)  HR: 98 (09-09-22 @ 20:20) (98 - 101)  BP: 106/70 (09-09-22 @ 20:20) (90/70 - 106/70)  RR: 18 (09-09-22 @ 20:20) (18 - 18)  SpO2: 97% (09-09-22 @ 20:20) (97% - 97%)    GENERAL: NAD, lying in bed comfortably  HEAD:  Atraumatic, Normocephalic  EYES: EOMI, PERRLA, conjunctiva and sclera clear  ENT: Moist mucous membranes  NECK: Supple, No JVD  CHEST/LUNG: Clear to auscultation bilaterally; No rales, rhonchi, wheezing, or rubs. Unlabored respirations  HEART: Regular rate and rhythm; No murmurs, rubs, or gallops  ABDOMEN: BSx4; Soft, nontender, nondistended  EXTREMITIES:  2+ Peripheral Pulses, brisk capillary refill. No clubbing, cyanosis, or edema  NERVOUS SYSTEM:  A&Ox3, Power 5/5 in B/L UE and LE, sensation intact, no CN deficits, no cerebellar signs, normal gait.   SKIN: No rashes or lesions  Back: B/l paraspinal tenderness (R>L) on palpation. Patient felt back pain upon SLR b/l

## 2022-09-10 NOTE — H&P ADULT - NSICDXFAMILYHX_GEN_ALL_CORE_FT
FAMILY HISTORY:  Mother  Still living? Unknown  Family history of thyroid cancer, Age at diagnosis: Age Unknown    Sibling  Still living? Unknown  Family history of malignant neoplasm of kidney, Age at diagnosis: Age Unknown  Family history of pancreatic cancer, Age at diagnosis: Age Unknown  Family history of thyroid cancer, Age at diagnosis: Age Unknown

## 2022-09-10 NOTE — PROGRESS NOTE ADULT - SUBJECTIVE AND OBJECTIVE BOX
Orthopedics     Pt seen and examined at the bedside. Pain is well controlled at this time,, no concerns at this time.     Vital Signs Last 24 Hrs  T(C): 36.9 (09-10-22 @ 06:56), Max: 37.1 (09-09-22 @ 20:20)  T(F): 98.4 (09-10-22 @ 06:56), Max: 98.7 (09-09-22 @ 20:20)  HR: 89 (09-10-22 @ 06:56) (89 - 101)  BP: 105/71 (09-10-22 @ 06:56) (90/70 - 112/89)  BP(mean): --  RR: 18 (09-10-22 @ 06:56) (17 - 18)  SpO2: 98% (09-10-22 @ 06:56) (97% - 99%)                        11.2   7.85  )-----------( 318      ( 10 Sep 2022 07:19 )             34.6     10 Sep 2022 07:21    138    |  104    |  15     ----------------------------<  94     3.9     |  24     |  0.84     Ca    9.3        10 Sep 2022 07:21  Phos  3.2       10 Sep 2022 07:21  Mg     2.1       10 Sep 2022 07:21    TPro  7.3    /  Alb  3.7    /  TBili  0.5    /  DBili  x      /  AST  17     /  ALT  20     /  AlkPhos  98     10 Sep 2022 07:21    PT/INR - ( 09 Sep 2022 23:26 )   PT: 14.3 sec;   INR: 1.23 ratio         PTT - ( 09 Sep 2022 23:26 )  PTT:29.7 sec    Exam:  Gen: NAD  SPINE:  Skin intact  NTTP over the bony prominences of the cervical // thoracic // lumbar // sacral spine  No bony step-offs  Grossly moving all extremities  + Median/Radial/Ulnar/Musculocutaneous/Axillary nerves intact  + Hip Flexors/Quads/Hamstrings/TA/EHL/FHL/GS  SILT C5-T1  SILT L2-S1  + Radial Pulse  + DP/PT Pulses  No saddle anesthesia  + rectal tone      Motor:                          Deltoid       Biceps      Triceps      Wrist Ext      Finger Flex    Finger Abduction   RIGHT             5/5             5/5             5/5             5/5                 5/5                     5/5  LEFT                5/5             5/5             5/5             5/5                 5/5                     5/5                          Hip Flex       Knee Ext        Knee Flex     Dorsiflex      Hallux Ext        PlantarFlex  RIGHT            5/5                5/5                 5/5               5/5                 5/5                    5/5  LEFT               5/5                5/5                 5/5               5/5                 5/5                    5/5      Sensory:                      C5      C6      C7      C8       T1          RIGHT          2         2        2         2         2          (0=absent, 1=impaired, 2=normal, NT=not testable)  LEFT             2         2        2         2         2          (0=absent, 1=impaired, 2=normal, NT=not testable)                        L2        L3       L4      L5       S1          RIGHT        2          2         2        2        2           (0=absent, 1=impaired, 2=normal, NT=not testable)  LEFT           2          2         2        2        2           (0=absent, 1=impaired, 2=normal, NT=not testable)    Negative Hoang's sign bilaterally  Negative Babinski bilaterally   Negative Myoclonus bilaterally            A/P: 46M w/ T8-9 changes on outpatient CT chest consistent with epidural abscess, OM    Imaging reviewed  Recommend MRI CTL Spine w/wo con to r/o skip lesions  ID C/s   Continue IV Abx  Analgesia  WBAT  DVT ppx per primary  PT/OT   Further ortho spine recs pending MR Imaging  NPO in preop for possible OR 9/10  Please optimize and clear for possible OR pending MR Imaging   Orthopedics     Pt seen and examined at his bedside. Pain is well controlled at this time,, no concerns at this time.     Vital Signs Last 24 Hrs  T(C): 36.9 (09-10-22 @ 06:56), Max: 37.1 (09-09-22 @ 20:20)  T(F): 98.4 (09-10-22 @ 06:56), Max: 98.7 (09-09-22 @ 20:20)  HR: 89 (09-10-22 @ 06:56) (89 - 101)  BP: 105/71 (09-10-22 @ 06:56) (90/70 - 112/89)  BP(mean): --  RR: 18 (09-10-22 @ 06:56) (17 - 18)  SpO2: 98% (09-10-22 @ 06:56) (97% - 99%)                        11.2   7.85  )-----------( 318      ( 10 Sep 2022 07:19 )             34.6     10 Sep 2022 07:21    138    |  104    |  15     ----------------------------<  94     3.9     |  24     |  0.84     Ca    9.3        10 Sep 2022 07:21  Phos  3.2       10 Sep 2022 07:21  Mg     2.1       10 Sep 2022 07:21    TPro  7.3    /  Alb  3.7    /  TBili  0.5    /  DBili  x      /  AST  17     /  ALT  20     /  AlkPhos  98     10 Sep 2022 07:21    PT/INR - ( 09 Sep 2022 23:26 )   PT: 14.3 sec;   INR: 1.23 ratio         PTT - ( 09 Sep 2022 23:26 )  PTT:29.7 sec    Exam:  Gen: NAD  SPINE:  Skin intact  NTTP over the bony prominences of the cervical // thoracic // lumbar // sacral spine  No bony step-offs  Grossly moving all extremities  + Median/Radial/Ulnar/Musculocutaneous/Axillary nerves intact  + Hip Flexors/Quads/Hamstrings/TA/EHL/FHL/GS  SILT C5-T1  SILT L2-S1  + Radial Pulse  + DP/PT Pulses  No saddle anesthesia  + rectal tone      Motor:                          Deltoid       Biceps      Triceps      Wrist Ext      Finger Flex    Finger Abduction   RIGHT             5/5             5/5             5/5             5/5                 5/5                     5/5  LEFT                5/5             5/5             5/5             5/5                 5/5                     5/5                          Hip Flex       Knee Ext        Knee Flex     Dorsiflex      Hallux Ext        PlantarFlex  RIGHT            5/5                5/5                 5/5               5/5                 5/5                    5/5  LEFT               5/5                5/5                 5/5               5/5                 5/5                    5/5      Sensory:                      C5      C6      C7      C8       T1          RIGHT          2         2        2         2         2          (0=absent, 1=impaired, 2=normal, NT=not testable)  LEFT             2         2        2         2         2          (0=absent, 1=impaired, 2=normal, NT=not testable)                        L2        L3       L4      L5       S1          RIGHT        2          2         2        2        2           (0=absent, 1=impaired, 2=normal, NT=not testable)  LEFT           2          2         2        2        2           (0=absent, 1=impaired, 2=normal, NT=not testable)    Negative Hoang's sign bilaterally  Negative Babinski bilaterally   Negative Myoclonus bilaterally            A/P: 46M w/ T8-9 changes on outpatient CT chest consistent with epidural abscess, OM    Imaging reviewed  Recommend MRI CTL Spine w/wo con to r/o skip lesions  ID C/s   Continue IV Abx  Analgesia  WBAT  DVT ppx per primary  PT/OT   Further ortho spine recs pending MR Imaging  NPO in preop for possible OR 9/10  Please optimize and clear for possible OR pending MR Imaging

## 2022-09-10 NOTE — PATIENT PROFILE ADULT - NSPRONUTRITIONRISK_GEN_A_NUR
[facility] TRIAGE OFFICER DIRECT ADMISSION REPORT  Transferring facility:[facility]  Transferring physician: See transfer center for details  Transferring facility/physician contact number: See Transfer center for details  Chief complaint: altered mental status  Pertinent history & patient course: Patient admitted for altered mental status, intubated and on ventilator. Possible baclofen withdrawal/overdose.  Upward gaze, pending eeg for assessment.  [employer] insurance and requesting transfer to [facility].  Pertinent imaging & lab results: CT head normal. Past medical history includes hypothyroidism and MS.    Code Status: Full code per transferring provider, I personally verified with the transferring provider patient's code status and the transferring provider has confirmed this with the patient.  Further work up or recommendations per triage officer prior to transfer: None  Consultants called prior to transfer and pertinent input from consultants: Neurology aware that we may consult and of transfer, if eeg not done outside will need eeg at this facility.    Patient accepted for transfer: Yes  Consultants to be called upon arrival: Neurology if necessary following assessment  Admission status: Inpatient.   Floor requested: Any  If ICU transfer, name of intensivist case discussed with and pertinent input from critical care: Dr Coppola    Please inform the triage officer upon arrival of the patient to [facility] for assignment of a hospitalist to perform admission.     For any question or concerns regarding the care of this patient, please reach out to the assigned hospitalist.            No indicators present

## 2022-09-10 NOTE — PROGRESS NOTE ADULT - ATTENDING COMMENTS
46 with PMH MSSA bacteremia on Daptomycin presenting with paraspinal abscess seen on outpatient imaging. MRI pending. Possible OR with orthopedics. ID consulted, recs appreciated. Rest of plan as above.

## 2022-09-10 NOTE — H&P ADULT - NSHPLABSRESULTS_GEN_ALL_CORE
LABS:                         11.9   9.68  )-----------( 408      ( 09 Sep 2022 23:27 )             37.6     09-09    139  |  101  |  17  ----------------------------<  91  3.6   |  26  |  0.92    Ca    9.8      09 Sep 2022 23:26      PT/INR - ( 09 Sep 2022 23:26 )   PT: 14.3 sec;   INR: 1.23 ratio         PTT - ( 09 Sep 2022 23:26 )  PTT:29.7 sec        Records reviewed from prior hospitalization.  Labs reviewed remarkable for   EKG personally reviewed   CXR personally reviewed

## 2022-09-10 NOTE — PROGRESS NOTE ADULT - PROBLEM SELECTOR PLAN 2
- Per Ortho spine request for medical clearance before surgery     # Revised Cardiac Risk Index for Pre-Operative Risk:  0 points, 3.9 %  30-day risk of death, MI, or cardiac arrest     # Pt is a low risk candidate for surgery     # NPO     # Hold DVT

## 2022-09-10 NOTE — PROGRESS NOTE ADULT - SUBJECTIVE AND OBJECTIVE BOX
PROGRESS NOTE:     Patient is a 46y old  Male who presents with a chief complaint of Paraspinal abscess on CT (10 Sep 2022 09:34)      SUBJECTIVE / OVERNIGHT EVENTS:  NAEON. Pt was seen at the bedside and lying comfortably in the bed. Pt endorsed mild-moderate midback pain that radiated to bilateral ribs. No other complaints such as headache, fever, chill, sob, chest pain, no constipation, n/w, diarrhea.       ADDITIONAL REVIEW OF SYSTEMS:    MEDICATIONS  (STANDING):  cyclobenzaprine 10 milliGRAM(s) Oral at bedtime  DAPTOmycin IVPB 700 milliGRAM(s) IV Intermittent every 24 hours    MEDICATIONS  (PRN):  acetaminophen     Tablet .. 650 milliGRAM(s) Oral every 6 hours PRN Temp greater or equal to 38C (100.4F), Mild Pain (1 - 3)      CAPILLARY BLOOD GLUCOSE        I&O's Summary      PHYSICAL EXAM:  Vital Signs Last 24 Hrs  T(C): 36.9 (10 Sep 2022 06:56), Max: 37.1 (09 Sep 2022 20:20)  T(F): 98.4 (10 Sep 2022 06:56), Max: 98.7 (09 Sep 2022 20:20)  HR: 89 (10 Sep 2022 06:56) (89 - 101)  BP: 105/71 (10 Sep 2022 06:56) (90/70 - 112/89)  BP(mean): --  RR: 18 (10 Sep 2022 06:56) (17 - 18)  SpO2: 98% (10 Sep 2022 06:56) (97% - 99%)    Parameters below as of 10 Sep 2022 06:56  Patient On (Oxygen Delivery Method): room air        GENERAL: No acute distress, well-developed  HEAD:  Atraumatic, Normocephalic  EYES: EOMI, PERRLA, conjunctiva and sclera clear  NECK: Supple, no lymphadenopathy, no JVD  CHEST/LUNG: CTAB; No wheezes, rales, or rhonchi  HEART: Regular rate and rhythm; No murmurs, rubs, or gallops  ABDOMEN: Soft, non-tender, non-distended; normal bowel sounds, no organomegaly  EXTREMITIES:  2+ peripheral pulses b/l, No clubbing, cyanosis, or edema. PICC line in th left arm without skin erythema, induration  NEUROLOGY: A&O x 4, no focal deficits  SKIN: No rashes or lesions    LABS:                        11.2   7.85  )-----------( 318      ( 10 Sep 2022 07:19 )             34.6     09-10    138  |  104  |  15  ----------------------------<  94  3.9   |  24  |  0.84    Ca    9.3      10 Sep 2022 07:21  Phos  3.2     09-10  Mg     2.1     09-10    TPro  7.3  /  Alb  3.7  /  TBili  0.5  /  DBili  x   /  AST  17  /  ALT  20  /  AlkPhos  98  09-10    PT/INR - ( 09 Sep 2022 23:26 )   PT: 14.3 sec;   INR: 1.23 ratio         PTT - ( 09 Sep 2022 23:26 )  PTT:29.7 sec            RADIOLOGY & ADDITIONAL TESTS:  Results Reviewed:   Imaging Personally Reviewed:  Electrocardiogram Personally Reviewed:    COORDINATION OF CARE:  Care Discussed with Consultants/Other Providers [Y/N]:  Prior or Outpatient Records Reviewed [Y/N]:

## 2022-09-10 NOTE — H&P ADULT - HISTORY OF PRESENT ILLNESS
46M with PMH: presents to the ED with complaints of back and rib tightness since 1 week.    Patient was diagnosed by CT angio of a paraspinal abscess on the ventral margin of T8 and T9.   Patient was in his usual state of health until around 3.5 weeks ago when he presented to [facility] with syncope, found to have MSSA bacteremia was given 2 days of IV vanc and started on IV daptomycin since Aug 17th. Follows with ID Dr. Krause  [phone] who reports that around a week ago patient started experiencing back and rib tightness and rising ESR/CRP to 60s and 30s respectively. Received a CTA and found to have developing paraspinal abscess at ventral margin of T8-T9 disc space and presented to ED for further evaluation.    PMH:    PSG:    Family History:    Social History:     ED Course:  Vitals: Afebrile, , BP: 90/70 --> 106/70  saturating 97% on RA   Medications: none administered   EKG:  46M with PMH: presents to the ED with complaints of back and rib tightness since 1 week.    Patient was diagnosed by CT angio of a paraspinal abscess on the ventral margin of T8 and T9.   Patient was in his usual state of health until around 3.5 weeks ago when he presented to [facility] with syncope, found to have MSSA bacteremia was given 2 days of IV vanc and started on IV daptomycin since Aug 17th. Follows with ID Dr. Krause  [phone] who reports that around a week ago patient started experiencing back and rib tightness and rising ESR/CRP to 60s and 30s respectively. Received a CTA and found to have developing paraspinal abscess at ventral margin of T8-T9 disc space and presented to ED for further evaluation.    PMH:    Recent ANA in Aug 22: Summary:    1. Left ventricle: The cavity size is normal. Left ventricular ejection     fraction is 55-60%.  2. Right ventricle: The cavity size is normal. Right ventricular systolic     function is normal.  3. Left atrium: There is no thrombus in the left atrium or the left atrial     appendage. There is no evidence of a patent foramen ovale by color     Doppler and agitated saline interrogation of the interatrial septum.  4. Mitral valve: There is no evidence of mitral valve regurgitation.  5. Aortic valve: There is no valvular aortic regurgitation.  6. Tricuspid valve: There is no tricuspid valve regurgitation.  7. Pericardium, extracardiac: There is no pericardial effusion.  8. No masses were present, suggestive of endocarditis.    PSG:    Family History:    Social History:     ED Course:  Vitals: Afebrile, , BP: 90/70 --> 106/70  saturating 97% on RA   Medications: none administered   EKG:  46M with PMH: recent syncope and MSSA bacteremia presents to the ED with complaints of persistent back and rib tightness since 1 week. Patient was recently admitted from Aug 15 to Aug 20th, initially evaluated at [facility] for   syncope, however found to have MSSA bacteremia. Patient was initially started on IV vancomycin, later switched to IV daptomycin. Endocarditis was r/o with negative ANA, however no source of infection was found. Patient was discharged with appropriate ID follow-up and takes IV daptomycin 500 mg daily via PICC line.   At time of admission, patient complained of back pain, located mid-back, dull aching, initially moderate to severe in intensity, aggravated by deep inspiration and movement.   Associated with b/l rib tightness   With IV abx, the back pain diminished, today patient reports 1/10 pain.  Yesterday w outpatient ID doctor, due to persistent back pain, a CT spine was done, which revealed a paraspinal abscess on the ventral margin of T8 and T9, w elevated ESR and CRP, and was referred to the ER for further management.     PMH:  PCP- Dr.Khalid Bhakta   ID:  ID Dr. Krause  [phone]     Recent ANA in Aug 22: Summary:    1. Left ventricle: The cavity size is normal. Left ventricular ejection     fraction is 55-60%.  2. Right ventricle: The cavity size is normal. Right ventricular systolic     function is normal.  3. Left atrium: There is no thrombus in the left atrium or the left atrial     appendage. There is no evidence of a patent foramen ovale by color     Doppler and agitated saline interrogation of the interatrial septum.  4. Mitral valve: There is no evidence of mitral valve regurgitation.  5. Aortic valve: There is no valvular aortic regurgitation.  6. Tricuspid valve: There is no tricuspid valve regurgitation.  7. Pericardium, extracardiac: There is no pericardial effusion.  8. No masses were present, suggestive of endocarditis.    PSG: No prior surgeries    Family History: Mother- Thyroid Cancer, Sister- Thyroid/Kidney malignancy,  Older brother- passed away of pancreatic cancer     Social History:  Lives at home with wife and son   Is an    Social ETOH, no smoking or other drug use      ED Course:  Vitals: Afebrile, , BP: 90/70 --> 106/70  saturating 97% on RA   Medications: none administered   EKG: NSR @ rate 96 bpm, no ST changes

## 2022-09-10 NOTE — H&P ADULT - NSHPREVIEWOFSYSTEMS_GEN_ALL_CORE
Review of Systems:   CONSTITUTIONAL: No fever, weight loss  EYES: No eye pain, visual disturbances, or discharge  ENMT:  No difficulty hearing, tinnitus, vertigo; No sinus or throat pain  RESPIRATORY: No SOB. No cough, wheezing, chills or hemoptysis  CARDIOVASCULAR: No chest pain, palpitations, dizziness, or leg swelling  GASTROINTESTINAL: No abdominal or epigastric pain. No nausea, vomiting, or hematemesis; No diarrhea or constipation. No melena or hematochezia.  GENITOURINARY: No dysuria, frequency, hematuria, or incontinence  NEUROLOGICAL: No headaches, memory loss, loss of strength, numbness, or tremors  SKIN: No itching, burning, rashes, or lesions   LYMPH NODES: No enlarged glands  ENDOCRINE: No heat or cold intolerance; No hair loss  MUSCULOSKELETAL: No joint pain or swelling; No muscle, back pain  PSYCHIATRIC: No depression, anxiety, mood swings, or difficulty sleeping  HEME/LYMPH: No easy bruising, or bleeding gums Review of Systems:   CONSTITUTIONAL: No fever, weight loss  EYES: No eye pain, visual disturbances, or discharge  ENMT:  No difficulty hearing, tinnitus, vertigo; No sinus or throat pain  RESPIRATORY: No SOB. No cough, wheezing, chills or hemoptysis  CARDIOVASCULAR: No chest pain, palpitations, dizziness, or leg swelling  GASTROINTESTINAL: No abdominal or epigastric pain. No nausea, vomiting, or hematemesis; No diarrhea or constipation. No melena or hematochezia.  GENITOURINARY: No dysuria, frequency, hematuria, or incontinence  NEUROLOGICAL: No headaches, memory loss, loss of strength, numbness, or tremors  SKIN: No itching, burning, rashes, or lesions   LYMPH NODES: No enlarged glands  ENDOCRINE: No heat or cold intolerance; No hair loss  MUSCULOSKELETAL: Back pain+ No joint pain or swelling;   PSYCHIATRIC: No depression, anxiety, mood swings, or difficulty sleeping  HEME/LYMPH: No easy bruising, or bleeding gums

## 2022-09-10 NOTE — PROGRESS NOTE ADULT - PROBLEM SELECTOR PLAN 3
MSSA bacteremia tx at [facility]   TTE negative, no clear source at that time, now currently tx for paraspinal abscess  -Continue IV daptomycin 700 mg daily   -ID consult in AM   -F/u blood cultures

## 2022-09-11 ENCOUNTER — RESULT REVIEW (OUTPATIENT)
Age: 47
End: 2022-09-11

## 2022-09-11 LAB
ALBUMIN SERPL ELPH-MCNC: 3.7 G/DL — SIGNIFICANT CHANGE UP (ref 3.3–5)
ALP SERPL-CCNC: 99 U/L — SIGNIFICANT CHANGE UP (ref 40–120)
ALT FLD-CCNC: 16 U/L — SIGNIFICANT CHANGE UP (ref 10–45)
ANION GAP SERPL CALC-SCNC: 11 MMOL/L — SIGNIFICANT CHANGE UP (ref 5–17)
ANION GAP SERPL CALC-SCNC: 13 MMOL/L — SIGNIFICANT CHANGE UP (ref 5–17)
APTT BLD: 27.3 SEC — LOW (ref 27.5–35.5)
AST SERPL-CCNC: 16 U/L — SIGNIFICANT CHANGE UP (ref 10–40)
BASOPHILS # BLD AUTO: 0.02 K/UL — SIGNIFICANT CHANGE UP (ref 0–0.2)
BASOPHILS NFR BLD AUTO: 0.3 % — SIGNIFICANT CHANGE UP (ref 0–2)
BILIRUB SERPL-MCNC: 0.5 MG/DL — SIGNIFICANT CHANGE UP (ref 0.2–1.2)
BLD GP AB SCN SERPL QL: NEGATIVE — SIGNIFICANT CHANGE UP
BUN SERPL-MCNC: 12 MG/DL — SIGNIFICANT CHANGE UP (ref 7–23)
BUN SERPL-MCNC: 17 MG/DL — SIGNIFICANT CHANGE UP (ref 7–23)
CALCIUM SERPL-MCNC: 8.9 MG/DL — SIGNIFICANT CHANGE UP (ref 8.4–10.5)
CALCIUM SERPL-MCNC: 9.2 MG/DL — SIGNIFICANT CHANGE UP (ref 8.4–10.5)
CHLORIDE SERPL-SCNC: 104 MMOL/L — SIGNIFICANT CHANGE UP (ref 96–108)
CHLORIDE SERPL-SCNC: 109 MMOL/L — HIGH (ref 96–108)
CK SERPL-CCNC: 50 U/L — SIGNIFICANT CHANGE UP (ref 30–200)
CO2 SERPL-SCNC: 22 MMOL/L — SIGNIFICANT CHANGE UP (ref 22–31)
CO2 SERPL-SCNC: 23 MMOL/L — SIGNIFICANT CHANGE UP (ref 22–31)
CREAT SERPL-MCNC: 0.79 MG/DL — SIGNIFICANT CHANGE UP (ref 0.5–1.3)
CREAT SERPL-MCNC: 0.9 MG/DL — SIGNIFICANT CHANGE UP (ref 0.5–1.3)
EGFR: 105 ML/MIN/1.73M2 — SIGNIFICANT CHANGE UP
EGFR: 110 ML/MIN/1.73M2 — SIGNIFICANT CHANGE UP
EOSINOPHIL # BLD AUTO: 0.07 K/UL — SIGNIFICANT CHANGE UP (ref 0–0.5)
EOSINOPHIL NFR BLD AUTO: 1 % — SIGNIFICANT CHANGE UP (ref 0–6)
GAS PNL BLDA: SIGNIFICANT CHANGE UP
GLUCOSE SERPL-MCNC: 143 MG/DL — HIGH (ref 70–99)
GLUCOSE SERPL-MCNC: 147 MG/DL — HIGH (ref 70–99)
HCT VFR BLD CALC: 34 % — LOW (ref 39–50)
HCT VFR BLD CALC: 34.1 % — LOW (ref 39–50)
HGB BLD-MCNC: 11 G/DL — LOW (ref 13–17)
HGB BLD-MCNC: 11 G/DL — LOW (ref 13–17)
IMM GRANULOCYTES NFR BLD AUTO: 1 % — SIGNIFICANT CHANGE UP (ref 0–1.5)
INR BLD: 1.29 RATIO — HIGH (ref 0.88–1.16)
LYMPHOCYTES # BLD AUTO: 0.73 K/UL — LOW (ref 1–3.3)
LYMPHOCYTES # BLD AUTO: 9.9 % — LOW (ref 13–44)
MAGNESIUM SERPL-MCNC: 2.2 MG/DL — SIGNIFICANT CHANGE UP (ref 1.6–2.6)
MCHC RBC-ENTMCNC: 28 PG — SIGNIFICANT CHANGE UP (ref 27–34)
MCHC RBC-ENTMCNC: 28.4 PG — SIGNIFICANT CHANGE UP (ref 27–34)
MCHC RBC-ENTMCNC: 32.3 GM/DL — SIGNIFICANT CHANGE UP (ref 32–36)
MCHC RBC-ENTMCNC: 32.4 GM/DL — SIGNIFICANT CHANGE UP (ref 32–36)
MCV RBC AUTO: 86.8 FL — SIGNIFICANT CHANGE UP (ref 80–100)
MCV RBC AUTO: 87.6 FL — SIGNIFICANT CHANGE UP (ref 80–100)
MONOCYTES # BLD AUTO: 0.12 K/UL — SIGNIFICANT CHANGE UP (ref 0–0.9)
MONOCYTES NFR BLD AUTO: 1.6 % — LOW (ref 2–14)
MRSA PCR RESULT.: SIGNIFICANT CHANGE UP
NEUTROPHILS # BLD AUTO: 6.34 K/UL — SIGNIFICANT CHANGE UP (ref 1.8–7.4)
NEUTROPHILS NFR BLD AUTO: 86.2 % — HIGH (ref 43–77)
NRBC # BLD: 0 /100 WBCS — SIGNIFICANT CHANGE UP (ref 0–0)
NRBC # BLD: 0 /100 WBCS — SIGNIFICANT CHANGE UP (ref 0–0)
PHOSPHATE SERPL-MCNC: 2.9 MG/DL — SIGNIFICANT CHANGE UP (ref 2.5–4.5)
PLATELET # BLD AUTO: 289 K/UL — SIGNIFICANT CHANGE UP (ref 150–400)
PLATELET # BLD AUTO: 344 K/UL — SIGNIFICANT CHANGE UP (ref 150–400)
POTASSIUM SERPL-MCNC: 3.9 MMOL/L — SIGNIFICANT CHANGE UP (ref 3.5–5.3)
POTASSIUM SERPL-MCNC: 4.1 MMOL/L — SIGNIFICANT CHANGE UP (ref 3.5–5.3)
POTASSIUM SERPL-SCNC: 3.9 MMOL/L — SIGNIFICANT CHANGE UP (ref 3.5–5.3)
POTASSIUM SERPL-SCNC: 4.1 MMOL/L — SIGNIFICANT CHANGE UP (ref 3.5–5.3)
PROT SERPL-MCNC: 7.3 G/DL — SIGNIFICANT CHANGE UP (ref 6–8.3)
PROTHROM AB SERPL-ACNC: 14.9 SEC — HIGH (ref 10.5–13.4)
RBC # BLD: 3.88 M/UL — LOW (ref 4.2–5.8)
RBC # BLD: 3.93 M/UL — LOW (ref 4.2–5.8)
RBC # FLD: 12.5 % — SIGNIFICANT CHANGE UP (ref 10.3–14.5)
RBC # FLD: 12.6 % — SIGNIFICANT CHANGE UP (ref 10.3–14.5)
RH IG SCN BLD-IMP: POSITIVE — SIGNIFICANT CHANGE UP
S AUREUS DNA NOSE QL NAA+PROBE: DETECTED
SODIUM SERPL-SCNC: 138 MMOL/L — SIGNIFICANT CHANGE UP (ref 135–145)
SODIUM SERPL-SCNC: 144 MMOL/L — SIGNIFICANT CHANGE UP (ref 135–145)
WBC # BLD: 7.35 K/UL — SIGNIFICANT CHANGE UP (ref 3.8–10.5)
WBC # BLD: 7.68 K/UL — SIGNIFICANT CHANGE UP (ref 3.8–10.5)
WBC # FLD AUTO: 7.35 K/UL — SIGNIFICANT CHANGE UP (ref 3.8–10.5)
WBC # FLD AUTO: 7.68 K/UL — SIGNIFICANT CHANGE UP (ref 3.8–10.5)

## 2022-09-11 PROCEDURE — 99233 SBSQ HOSP IP/OBS HIGH 50: CPT | Mod: GC

## 2022-09-11 PROCEDURE — 63046 LAM FACETEC & FORAMOT THRC: CPT | Mod: 59

## 2022-09-11 PROCEDURE — 99233 SBSQ HOSP IP/OBS HIGH 50: CPT | Mod: 57

## 2022-09-11 PROCEDURE — 22610 ARTHRD PST TQ 1NTRSPC THRC: CPT

## 2022-09-11 PROCEDURE — 22614 ARTHRD PST TQ 1NTRSPC EA ADD: CPT

## 2022-09-11 PROCEDURE — 22842 INSERT SPINE FIXATION DEVICE: CPT

## 2022-09-11 PROCEDURE — 88304 TISSUE EXAM BY PATHOLOGIST: CPT | Mod: 26

## 2022-09-11 PROCEDURE — 63276 BX/EXC XDRL SPINE LESN THRC: CPT

## 2022-09-11 PROCEDURE — 63048 LAM FACETEC &FORAMOT EA ADDL: CPT

## 2022-09-11 DEVICE — SCREW XIA 3 5.5X40MM: Type: IMPLANTABLE DEVICE | Status: FUNCTIONAL

## 2022-09-11 DEVICE — SURGIFLO MATRIX WITH THROMBIN KIT: Type: IMPLANTABLE DEVICE | Status: FUNCTIONAL

## 2022-09-11 DEVICE — SCREW SERRATO 6.5X45MM: Type: IMPLANTABLE DEVICE | Status: FUNCTIONAL

## 2022-09-11 DEVICE — IMPLANTABLE DEVICE: Type: IMPLANTABLE DEVICE | Status: FUNCTIONAL

## 2022-09-11 DEVICE — SCREW BLOCKER BONE XIA: Type: IMPLANTABLE DEVICE | Status: FUNCTIONAL

## 2022-09-11 DEVICE — KIT A-LINE 1LUM 20G X 12CM SAFE KIT: Type: IMPLANTABLE DEVICE | Status: FUNCTIONAL

## 2022-09-11 DEVICE — SURGIFOAM PAD 8CM X 12.5CM X 10MM (100): Type: IMPLANTABLE DEVICE | Status: FUNCTIONAL

## 2022-09-11 RX ORDER — ONDANSETRON 8 MG/1
4 TABLET, FILM COATED ORAL ONCE
Refills: 0 | Status: DISCONTINUED | OUTPATIENT
Start: 2022-09-11 | End: 2022-09-11

## 2022-09-11 RX ORDER — ACETAMINOPHEN 500 MG
1000 TABLET ORAL ONCE
Refills: 0 | Status: COMPLETED | OUTPATIENT
Start: 2022-09-11 | End: 2022-09-12

## 2022-09-11 RX ORDER — HYDROMORPHONE HYDROCHLORIDE 2 MG/ML
0.5 INJECTION INTRAMUSCULAR; INTRAVENOUS; SUBCUTANEOUS EVERY 6 HOURS
Refills: 0 | Status: DISCONTINUED | OUTPATIENT
Start: 2022-09-11 | End: 2022-09-18

## 2022-09-11 RX ORDER — MAGNESIUM HYDROXIDE 400 MG/1
30 TABLET, CHEWABLE ORAL EVERY 12 HOURS
Refills: 0 | Status: DISCONTINUED | OUTPATIENT
Start: 2022-09-11 | End: 2022-09-18

## 2022-09-11 RX ORDER — CYCLOBENZAPRINE HYDROCHLORIDE 10 MG/1
5 TABLET, FILM COATED ORAL THREE TIMES A DAY
Refills: 0 | Status: DISCONTINUED | OUTPATIENT
Start: 2022-09-11 | End: 2022-09-18

## 2022-09-11 RX ORDER — DAPTOMYCIN 500 MG/10ML
500 INJECTION, POWDER, LYOPHILIZED, FOR SOLUTION INTRAVENOUS EVERY 24 HOURS
Refills: 0 | Status: DISCONTINUED | OUTPATIENT
Start: 2022-09-11 | End: 2022-09-12

## 2022-09-11 RX ORDER — PANTOPRAZOLE SODIUM 20 MG/1
40 TABLET, DELAYED RELEASE ORAL
Refills: 0 | Status: DISCONTINUED | OUTPATIENT
Start: 2022-09-11 | End: 2022-09-18

## 2022-09-11 RX ORDER — OXYCODONE HYDROCHLORIDE 5 MG/1
10 TABLET ORAL EVERY 4 HOURS
Refills: 0 | Status: DISCONTINUED | OUTPATIENT
Start: 2022-09-11 | End: 2022-09-18

## 2022-09-11 RX ORDER — KETOROLAC TROMETHAMINE 30 MG/ML
15 SYRINGE (ML) INJECTION ONCE
Refills: 0 | Status: DISCONTINUED | OUTPATIENT
Start: 2022-09-11 | End: 2022-09-11

## 2022-09-11 RX ORDER — HYDROMORPHONE HYDROCHLORIDE 2 MG/ML
0.5 INJECTION INTRAMUSCULAR; INTRAVENOUS; SUBCUTANEOUS
Refills: 0 | Status: DISCONTINUED | OUTPATIENT
Start: 2022-09-11 | End: 2022-09-11

## 2022-09-11 RX ORDER — SENNA PLUS 8.6 MG/1
2 TABLET ORAL AT BEDTIME
Refills: 0 | Status: DISCONTINUED | OUTPATIENT
Start: 2022-09-11 | End: 2022-09-18

## 2022-09-11 RX ORDER — OXYCODONE HYDROCHLORIDE 5 MG/1
5 TABLET ORAL EVERY 4 HOURS
Refills: 0 | Status: DISCONTINUED | OUTPATIENT
Start: 2022-09-11 | End: 2022-09-18

## 2022-09-11 RX ORDER — ACETAMINOPHEN 500 MG
650 TABLET ORAL EVERY 6 HOURS
Refills: 0 | Status: DISCONTINUED | OUTPATIENT
Start: 2022-09-11 | End: 2022-09-18

## 2022-09-11 RX ORDER — POLYETHYLENE GLYCOL 3350 17 G/17G
17 POWDER, FOR SOLUTION ORAL DAILY
Refills: 0 | Status: DISCONTINUED | OUTPATIENT
Start: 2022-09-11 | End: 2022-09-18

## 2022-09-11 RX ORDER — SODIUM CHLORIDE 9 MG/ML
1000 INJECTION INTRAMUSCULAR; INTRAVENOUS; SUBCUTANEOUS
Refills: 0 | Status: DISCONTINUED | OUTPATIENT
Start: 2022-09-11 | End: 2022-09-18

## 2022-09-11 RX ORDER — CEFAZOLIN SODIUM 1 G
2000 VIAL (EA) INJECTION EVERY 8 HOURS
Refills: 0 | Status: COMPLETED | OUTPATIENT
Start: 2022-09-12 | End: 2022-09-12

## 2022-09-11 RX ORDER — KETOROLAC TROMETHAMINE 30 MG/ML
10 SYRINGE (ML) INJECTION ONCE
Refills: 0 | Status: DISCONTINUED | OUTPATIENT
Start: 2022-09-11 | End: 2022-09-11

## 2022-09-11 RX ORDER — ACETAMINOPHEN 500 MG
1000 TABLET ORAL ONCE
Refills: 0 | Status: COMPLETED | OUTPATIENT
Start: 2022-09-11 | End: 2022-09-11

## 2022-09-11 RX ORDER — ONDANSETRON 8 MG/1
4 TABLET, FILM COATED ORAL EVERY 6 HOURS
Refills: 0 | Status: DISCONTINUED | OUTPATIENT
Start: 2022-09-11 | End: 2022-09-18

## 2022-09-11 RX ADMIN — Medication 15 MILLIGRAM(S): at 21:31

## 2022-09-11 RX ADMIN — Medication 15 MILLIGRAM(S): at 21:47

## 2022-09-11 RX ADMIN — DAPTOMYCIN 128 MILLIGRAM(S): 500 INJECTION, POWDER, LYOPHILIZED, FOR SOLUTION INTRAVENOUS at 10:54

## 2022-09-11 RX ADMIN — HYDROMORPHONE HYDROCHLORIDE 0.5 MILLIGRAM(S): 2 INJECTION INTRAMUSCULAR; INTRAVENOUS; SUBCUTANEOUS at 22:44

## 2022-09-11 RX ADMIN — HYDROMORPHONE HYDROCHLORIDE 0.5 MILLIGRAM(S): 2 INJECTION INTRAMUSCULAR; INTRAVENOUS; SUBCUTANEOUS at 23:15

## 2022-09-11 RX ADMIN — SODIUM CHLORIDE 75 MILLILITER(S): 9 INJECTION INTRAMUSCULAR; INTRAVENOUS; SUBCUTANEOUS at 21:02

## 2022-09-11 RX ADMIN — CYCLOBENZAPRINE HYDROCHLORIDE 5 MILLIGRAM(S): 10 TABLET, FILM COATED ORAL at 21:21

## 2022-09-11 RX ADMIN — Medication 400 MILLIGRAM(S): at 21:02

## 2022-09-11 RX ADMIN — Medication 1000 MILLIGRAM(S): at 21:22

## 2022-09-11 NOTE — PROGRESS NOTE ADULT - PROBLEM SELECTOR PLAN 3
MSSA bacteremia tx at [facility]   TTE negative, no clear source at that time, now currently tx for paraspinal abscess  -Continue IV daptomycin 700 mg daily   -ID consult in AM   -F/u blood cultures

## 2022-09-11 NOTE — PROGRESS NOTE ADULT - PROBLEM SELECTOR PLAN 1
detected on outpatient CT imaging  -ID recs appreciated, will continue IV daptomycin 700 mg daily for now  -Ortho spine recs appreciated-       # MRI spine confirmed paraspinal abscess at T8-9      # OR surgery tentatively today   -F/u blood cultures  -Tylenol PRN for back pain  -PT eval when appropriate

## 2022-09-11 NOTE — PROGRESS NOTE ADULT - SUBJECTIVE AND OBJECTIVE BOX
PROGRESS NOTE:   Authored by George Mejia MD [id number]    Patient is a 46y old  Male who presents with a chief complaint of Paraspinal abscess on CT (11 Sep 2022 01:11)      SUBJECTIVE / OVERNIGHT EVENTS:    NAEON. Pt was seen at the bedside and lying comfortably in the bed. Pt endorsed midback pain at his baseline. No other complaints such as headache, fever, chill, sob, chest pain, no constipation, n/w, diarrhea.       ADDITIONAL REVIEW OF SYSTEMS:    MEDICATIONS  (STANDING):  cyclobenzaprine 10 milliGRAM(s) Oral at bedtime  DAPTOmycin IVPB 700 milliGRAM(s) IV Intermittent every 24 hours  sodium chloride 0.9%. 1000 milliLiter(s) (75 mL/Hr) IV Continuous <Continuous>    MEDICATIONS  (PRN):  acetaminophen     Tablet .. 650 milliGRAM(s) Oral every 6 hours PRN Temp greater or equal to 38C (100.4F), Mild Pain (1 - 3)      CAPILLARY BLOOD GLUCOSE        I&O's Summary      PHYSICAL EXAM:  Vital Signs Last 24 Hrs  T(C): 36.9 (11 Sep 2022 05:15), Max: 36.9 (10 Sep 2022 06:56)  T(F): 98.4 (11 Sep 2022 05:15), Max: 98.4 (10 Sep 2022 06:56)  HR: 78 (11 Sep 2022 05:15) (78 - 102)  BP: 107/70 (11 Sep 2022 05:15) (105/71 - 118/80)  BP(mean): --  RR: 18 (11 Sep 2022 05:15) (18 - 18)  SpO2: 100% (11 Sep 2022 05:15) (98% - 100%)    Parameters below as of 11 Sep 2022 05:15  Patient On (Oxygen Delivery Method): BiPAP/CPAP        GENERAL: No acute distress, well-developed  HEAD:  Atraumatic, Normocephalic  EYES: EOMI, PERRLA, conjunctiva and sclera clear  NECK: Supple, no lymphadenopathy, no JVD  CHEST/LUNG: CTAB; No wheezes, rales, or rhonchi  HEART: Regular rate and rhythm; No murmurs, rubs, or gallops  ABDOMEN: Soft, non-tender, non-distended; normal bowel sounds, no organomegaly  EXTREMITIES:  2+ peripheral pulses b/l, No clubbing, cyanosis, or edema. PICC line in th left arm without skin erythema, induration  NEUROLOGY: A&O x 4, no focal deficits  SKIN: No rashes or lesions    LABS:                        11.0   7.68  )-----------( 344      ( 11 Sep 2022 01:01 )             34.1     09-11    138  |  104  |  17  ----------------------------<  143<H>  3.9   |  23  |  0.90    Ca    9.2      11 Sep 2022 01:03  Phos  2.9     09-11  Mg     2.2     09-11    TPro  7.3  /  Alb  3.7  /  TBili  0.5  /  DBili  x   /  AST  16  /  ALT  16  /  AlkPhos  99  09-11    PT/INR - ( 11 Sep 2022 01:01 )   PT: 14.9 sec;   INR: 1.29 ratio         PTT - ( 11 Sep 2022 01:01 )  PTT:27.3 sec  CARDIAC MARKERS ( 11 Sep 2022 01:03 )  x     / x     / 50 U/L / x     / x      CARDIAC MARKERS ( 10 Sep 2022 11:40 )  x     / x     / 52 U/L / x     / x              Culture - Blood (collected 09 Sep 2022 23:15)  Source: .Blood Blood  Preliminary Report (11 Sep 2022 04:01):    No growth to date.    Culture - Blood (collected 09 Sep 2022 22:55)  Source: .Blood Blood  Preliminary Report (11 Sep 2022 04:01):    No growth to date.        RADIOLOGY & ADDITIONAL TESTS:  Results Reviewed:   Imaging Personally Reviewed:  Electrocardiogram Personally Reviewed:    COORDINATION OF CARE:  Care Discussed with Consultants/Other Providers [Y/N]:  Prior or Outpatient Records Reviewed [Y/N]:

## 2022-09-11 NOTE — PROGRESS NOTE ADULT - ATTENDING COMMENTS
Agree with above. This is a patient with a T8-T9 osteo/discitis with associated epidural abscess. He has not responded to IV antibiotics for ~4 weeks. Given the severity of infection, non-responsive nature of infection to antibiotics we will pursue surgical decompression/fusion. I explained to the patient that given the risk of instability we will fixate this segment. We will proceed with a T6-T11 decompression/fusion. Risks and benefits were explained to the patient in great detail including pain, need for reoperation, injury to surrounding nerves/arteries/veins, paralysis, nerve root injury, csf leak, dural tear, instrumentation failure, nonunion, dvt/pe, infection. The patient understood these risks and proper informed consent was obtained.

## 2022-09-11 NOTE — PROGRESS NOTE ADULT - PROBLEM SELECTOR PLAN 5
F: not indicated  E: replete PRN   N: NPO for OR  DVT ppx: with lovenox, Hold for OR    Full Code    Dispo --> [facility]

## 2022-09-11 NOTE — PROGRESS NOTE ADULT - SUBJECTIVE AND OBJECTIVE BOX
Orthopedics Post-op Check  POD 0  Patient seen and examined at bedside with Dr. Balbuena. Currently in Pain. Denies numbness/tingling BL LE. No nausea or vomiting.    Vital Signs Last 24 Hrs  T(C): 36.2 (09-11-22 @ 20:25), Max: 36.9 (09-11-22 @ 05:15)  T(F): 97.2 (09-11-22 @ 20:25), Max: 98.4 (09-11-22 @ 05:15)  HR: 113 (09-11-22 @ 20:40) (78 - 113)  BP: 117/86 (09-11-22 @ 20:40) (104/59 - 126/76)  BP(mean): 98 (09-11-22 @ 20:40) (75 - 98)  RR: 16 (09-11-22 @ 20:40) (16 - 18)  SpO2: 95% (09-11-22 @ 20:40) (95% - 100%)                        11.0   7.68  )-----------( 344      ( 11 Sep 2022 01:01 )             34.1     11 Sep 2022 01:03    138    |  104    |  17     ----------------------------<  143    3.9     |  23     |  0.90     Ca    9.2        11 Sep 2022 01:03  Phos  2.9       11 Sep 2022 01:03  Mg     2.2       11 Sep 2022 01:03    TPro  7.3    /  Alb  3.7    /  TBili  0.5    /  DBili  x      /  AST  16     /  ALT  16     /  AlkPhos  99     11 Sep 2022 01:03    PT/INR - ( 11 Sep 2022 01:01 )   PT: 14.9 sec;   INR: 1.29 ratio         PTT - ( 11 Sep 2022 01:01 )  PTT:27.3 sec    Exam:  Gen: NAD, resting comfortably    Spine  HMV in place with SS output     Motor:                   C5                C6              C7               C8           T1   R            5/5                5/5            5/5             5/5          5/5  L             5/5               5/5             5/5             5/5          5/5                L2             L3             L4               L5            S1  R         5/5           5/5          5/5             5/5           5/5  L          5/5          5/5           5/5             5/5           5/5    Sensory:            C5         C6         C7      C8       T1        (0=absent, 1=impaired, 2=normal, NT=not testable)  R         2            2           2        2         2  L          2            2           2        2         2               L2          L3         L4      L5       S1         (0=absent, 1=impaired, 2=normal, NT=not testable)  R         2            2            2        2        2  L          2            2           2        2         2  Negative Hoang  Negative Babisnki  Negative clonus     A/P:  46yMale Stable POD 0  s/p T6-11 PSF, Lami T8-T10    -FU labs  -WBAT  -Please record HMV output  -Mitchell  -Pain control, multimodal  -PT/OT  -Abx treatment per ID  -FU OR cx  -DVT PE ppx- hold in setting of spine surgery and risk of epidural hematoma; SCD okay   -Incentive spirometry  -Care per primary team

## 2022-09-11 NOTE — PROGRESS NOTE ADULT - ATTENDING COMMENTS
46 with PMH MSSA bacteremia on Daptomycin presenting with paraspinal abscess. For OR with orthopedics 9/11. ID consulted, recs appreciated. Rest of plan as above.

## 2022-09-11 NOTE — PROGRESS NOTE ADULT - ASSESSMENT
46M with PMH: recent syncope and MSSA ?bacteremia, currently receiving IV daptomycin EOD 09/17 presents to the ED with complaints of persistent back and rib tightness since 1 week. Patient was found to have paraspinal abscess on outpatient CT on the ventral margin of T8 and T9.Admitted for management of paraspinal abscess , Ortho and ID started following. s/p MRI-spin confirming abscess. Planed for OR today.

## 2022-09-11 NOTE — PROGRESS NOTE ADULT - TIME BILLING
Please note that over 50 minutes of time was spent in care of this patient including   - previsit preparation  - in person visit  - post visit documentation  - discussion with colleagues  - review of imaging

## 2022-09-11 NOTE — PROGRESS NOTE ADULT - SUBJECTIVE AND OBJECTIVE BOX
Orthopedics     Pt seen and examined at his bedside. Pain controlled. Awaiting OR today.    Vital Signs Last 24 Hrs  T(C): 36.6 (10 Sep 2022 21:33), Max: 36.9 (10 Sep 2022 03:17)  T(F): 97.9 (10 Sep 2022 21:33), Max: 98.4 (10 Sep 2022 03:17)  HR: 102 (10 Sep 2022 21:33) (89 - 102)  BP: 118/77 (10 Sep 2022 21:33) (105/71 - 118/80)  RR: 18 (10 Sep 2022 21:33) (17 - 18)  SpO2: 98% (10 Sep 2022 21:33) (97% - 99%)  Parameters below as of 10 Sep 2022 21:33  Patient On (Oxygen Delivery Method): room air      LABS                                 11.2   7.85  )-----------( 318      ( 10 Sep 2022 07:19 )             34.6   09-10    138  |  104  |  15  ----------------------------<  94  3.9   |  24  |  0.84    Ca    9.3      10 Sep 2022 07:21  Phos  3.2     09-10  Mg     2.1     09-10    TPro  7.3  /  Alb  3.7  /  TBili  0.5  /  DBili  x   /  AST  17  /  ALT  20  /  AlkPhos  98  09-10      Exam:  Gen: Lying in bed, NAD  Resp: no increased WOB  SPINE:  Motor:                          Deltoid       Biceps      Triceps      Wrist Ext      Finger Flex    Finger Abduction   RIGHT             5/5             5/5             5/5             5/5                 5/5                     5/5  LEFT                5/5             5/5             5/5             5/5                 5/5                     5/5                          Hip Flex       Knee Ext        Knee Flex     Dorsiflex      Hallux Ext        PlantarFlex  RIGHT            5/5                5/5                 5/5               5/5                 5/5                    5/5  LEFT               5/5                5/5                 5/5               5/5                 5/5                    5/5      Sensory:                      C5      C6      C7      C8       T1          RIGHT          2         2        2         2         2          (0=absent, 1=impaired, 2=normal, NT=not testable)  LEFT             2         2        2         2         2          (0=absent, 1=impaired, 2=normal, NT=not testable)                        L2        L3       L4      L5       S1          RIGHT        2          2         2        2        2           (0=absent, 1=impaired, 2=normal, NT=not testable)  LEFT           2          2         2        2        2           (0=absent, 1=impaired, 2=normal, NT=not testable)      A/P: 46M w/ T8-10 osteomyelitis/discitis and ventral epidural abscess  -WBAT  -OR on 9/11/22  -f/u preop: CBC, BMP, coags, T&S x2, CXR, EKG, COVID  -NPO, IVF  -hold chemical DVT ppx for OR; SCDs OK  -medical clearance appreciated  -IV abx  -pain control  -PT/OT  -ID recs  -care per primary team

## 2022-09-12 LAB
ALBUMIN SERPL ELPH-MCNC: 3.9 G/DL — SIGNIFICANT CHANGE UP (ref 3.3–5)
ALP SERPL-CCNC: 99 U/L — SIGNIFICANT CHANGE UP (ref 40–120)
ALT FLD-CCNC: 17 U/L — SIGNIFICANT CHANGE UP (ref 10–45)
ANION GAP SERPL CALC-SCNC: 10 MMOL/L — SIGNIFICANT CHANGE UP (ref 5–17)
ANION GAP SERPL CALC-SCNC: 10 MMOL/L — SIGNIFICANT CHANGE UP (ref 5–17)
AST SERPL-CCNC: 25 U/L — SIGNIFICANT CHANGE UP (ref 10–40)
BASOPHILS # BLD AUTO: 0.01 K/UL — SIGNIFICANT CHANGE UP (ref 0–0.2)
BASOPHILS NFR BLD AUTO: 0.1 % — SIGNIFICANT CHANGE UP (ref 0–2)
BILIRUB SERPL-MCNC: 0.2 MG/DL — SIGNIFICANT CHANGE UP (ref 0.2–1.2)
BUN SERPL-MCNC: 14 MG/DL — SIGNIFICANT CHANGE UP (ref 7–23)
BUN SERPL-MCNC: 14 MG/DL — SIGNIFICANT CHANGE UP (ref 7–23)
CALCIUM SERPL-MCNC: 9.2 MG/DL — SIGNIFICANT CHANGE UP (ref 8.4–10.5)
CALCIUM SERPL-MCNC: 9.4 MG/DL — SIGNIFICANT CHANGE UP (ref 8.4–10.5)
CHLORIDE SERPL-SCNC: 101 MMOL/L — SIGNIFICANT CHANGE UP (ref 96–108)
CHLORIDE SERPL-SCNC: 102 MMOL/L — SIGNIFICANT CHANGE UP (ref 96–108)
CO2 SERPL-SCNC: 24 MMOL/L — SIGNIFICANT CHANGE UP (ref 22–31)
CO2 SERPL-SCNC: 24 MMOL/L — SIGNIFICANT CHANGE UP (ref 22–31)
CREAT SERPL-MCNC: 0.71 MG/DL — SIGNIFICANT CHANGE UP (ref 0.5–1.3)
CREAT SERPL-MCNC: 0.73 MG/DL — SIGNIFICANT CHANGE UP (ref 0.5–1.3)
EGFR: 112 ML/MIN/1.73M2 — SIGNIFICANT CHANGE UP
EGFR: 113 ML/MIN/1.73M2 — SIGNIFICANT CHANGE UP
EOSINOPHIL # BLD AUTO: 0.02 K/UL — SIGNIFICANT CHANGE UP (ref 0–0.5)
EOSINOPHIL NFR BLD AUTO: 0.1 % — SIGNIFICANT CHANGE UP (ref 0–6)
GLUCOSE SERPL-MCNC: 170 MG/DL — HIGH (ref 70–99)
GLUCOSE SERPL-MCNC: 175 MG/DL — HIGH (ref 70–99)
HCT VFR BLD CALC: 35.9 % — LOW (ref 39–50)
HCT VFR BLD CALC: 36.6 % — LOW (ref 39–50)
HGB BLD-MCNC: 11.7 G/DL — LOW (ref 13–17)
HGB BLD-MCNC: 11.7 G/DL — LOW (ref 13–17)
IMM GRANULOCYTES NFR BLD AUTO: 0.5 % — SIGNIFICANT CHANGE UP (ref 0–1.5)
LYMPHOCYTES # BLD AUTO: 0.96 K/UL — LOW (ref 1–3.3)
LYMPHOCYTES # BLD AUTO: 5.4 % — LOW (ref 13–44)
MAGNESIUM SERPL-MCNC: 2.3 MG/DL — SIGNIFICANT CHANGE UP (ref 1.6–2.6)
MCHC RBC-ENTMCNC: 28.3 PG — SIGNIFICANT CHANGE UP (ref 27–34)
MCHC RBC-ENTMCNC: 28.9 PG — SIGNIFICANT CHANGE UP (ref 27–34)
MCHC RBC-ENTMCNC: 32 GM/DL — SIGNIFICANT CHANGE UP (ref 32–36)
MCHC RBC-ENTMCNC: 32.6 GM/DL — SIGNIFICANT CHANGE UP (ref 32–36)
MCV RBC AUTO: 88.4 FL — SIGNIFICANT CHANGE UP (ref 80–100)
MCV RBC AUTO: 88.6 FL — SIGNIFICANT CHANGE UP (ref 80–100)
MONOCYTES # BLD AUTO: 0.79 K/UL — SIGNIFICANT CHANGE UP (ref 0–0.9)
MONOCYTES NFR BLD AUTO: 4.5 % — SIGNIFICANT CHANGE UP (ref 2–14)
NEUTROPHILS # BLD AUTO: 15.77 K/UL — HIGH (ref 1.8–7.4)
NEUTROPHILS NFR BLD AUTO: 89.4 % — HIGH (ref 43–77)
NRBC # BLD: 0 /100 WBCS — SIGNIFICANT CHANGE UP (ref 0–0)
NRBC # BLD: 0 /100 WBCS — SIGNIFICANT CHANGE UP (ref 0–0)
PHOSPHATE SERPL-MCNC: 2.8 MG/DL — SIGNIFICANT CHANGE UP (ref 2.5–4.5)
PLATELET # BLD AUTO: 377 K/UL — SIGNIFICANT CHANGE UP (ref 150–400)
PLATELET # BLD AUTO: 388 K/UL — SIGNIFICANT CHANGE UP (ref 150–400)
POTASSIUM SERPL-MCNC: 4.6 MMOL/L — SIGNIFICANT CHANGE UP (ref 3.5–5.3)
POTASSIUM SERPL-MCNC: 4.6 MMOL/L — SIGNIFICANT CHANGE UP (ref 3.5–5.3)
POTASSIUM SERPL-SCNC: 4.6 MMOL/L — SIGNIFICANT CHANGE UP (ref 3.5–5.3)
POTASSIUM SERPL-SCNC: 4.6 MMOL/L — SIGNIFICANT CHANGE UP (ref 3.5–5.3)
PROT SERPL-MCNC: 7.2 G/DL — SIGNIFICANT CHANGE UP (ref 6–8.3)
RBC # BLD: 4.05 M/UL — LOW (ref 4.2–5.8)
RBC # BLD: 4.14 M/UL — LOW (ref 4.2–5.8)
RBC # FLD: 12.6 % — SIGNIFICANT CHANGE UP (ref 10.3–14.5)
RBC # FLD: 12.6 % — SIGNIFICANT CHANGE UP (ref 10.3–14.5)
SODIUM SERPL-SCNC: 135 MMOL/L — SIGNIFICANT CHANGE UP (ref 135–145)
SODIUM SERPL-SCNC: 136 MMOL/L — SIGNIFICANT CHANGE UP (ref 135–145)
WBC # BLD: 17.59 K/UL — HIGH (ref 3.8–10.5)
WBC # BLD: 17.64 K/UL — HIGH (ref 3.8–10.5)
WBC # FLD AUTO: 17.59 K/UL — HIGH (ref 3.8–10.5)
WBC # FLD AUTO: 17.64 K/UL — HIGH (ref 3.8–10.5)

## 2022-09-12 PROCEDURE — 99233 SBSQ HOSP IP/OBS HIGH 50: CPT | Mod: GC

## 2022-09-12 PROCEDURE — 99232 SBSQ HOSP IP/OBS MODERATE 35: CPT

## 2022-09-12 RX ORDER — DAPTOMYCIN 500 MG/10ML
700 INJECTION, POWDER, LYOPHILIZED, FOR SOLUTION INTRAVENOUS EVERY 24 HOURS
Refills: 0 | Status: DISCONTINUED | OUTPATIENT
Start: 2022-09-12 | End: 2022-09-16

## 2022-09-12 RX ADMIN — Medication 650 MILLIGRAM(S): at 21:09

## 2022-09-12 RX ADMIN — CYCLOBENZAPRINE HYDROCHLORIDE 5 MILLIGRAM(S): 10 TABLET, FILM COATED ORAL at 13:49

## 2022-09-12 RX ADMIN — Medication 1000 MILLIGRAM(S): at 02:06

## 2022-09-12 RX ADMIN — POLYETHYLENE GLYCOL 3350 17 GRAM(S): 17 POWDER, FOR SOLUTION ORAL at 15:07

## 2022-09-12 RX ADMIN — OXYCODONE HYDROCHLORIDE 10 MILLIGRAM(S): 5 TABLET ORAL at 21:08

## 2022-09-12 RX ADMIN — Medication 650 MILLIGRAM(S): at 21:30

## 2022-09-12 RX ADMIN — OXYCODONE HYDROCHLORIDE 10 MILLIGRAM(S): 5 TABLET ORAL at 17:35

## 2022-09-12 RX ADMIN — OXYCODONE HYDROCHLORIDE 10 MILLIGRAM(S): 5 TABLET ORAL at 12:10

## 2022-09-12 RX ADMIN — Medication 650 MILLIGRAM(S): at 17:37

## 2022-09-12 RX ADMIN — Medication 650 MILLIGRAM(S): at 15:07

## 2022-09-12 RX ADMIN — Medication 650 MILLIGRAM(S): at 08:18

## 2022-09-12 RX ADMIN — OXYCODONE HYDROCHLORIDE 10 MILLIGRAM(S): 5 TABLET ORAL at 11:34

## 2022-09-12 RX ADMIN — Medication 1 TABLET(S): at 15:07

## 2022-09-12 RX ADMIN — CYCLOBENZAPRINE HYDROCHLORIDE 5 MILLIGRAM(S): 10 TABLET, FILM COATED ORAL at 04:48

## 2022-09-12 RX ADMIN — Medication 100 MILLIGRAM(S): at 02:06

## 2022-09-12 RX ADMIN — OXYCODONE HYDROCHLORIDE 10 MILLIGRAM(S): 5 TABLET ORAL at 21:38

## 2022-09-12 RX ADMIN — DAPTOMYCIN 128 MILLIGRAM(S): 500 INJECTION, POWDER, LYOPHILIZED, FOR SOLUTION INTRAVENOUS at 10:10

## 2022-09-12 RX ADMIN — CYCLOBENZAPRINE HYDROCHLORIDE 5 MILLIGRAM(S): 10 TABLET, FILM COATED ORAL at 18:58

## 2022-09-12 RX ADMIN — Medication 400 MILLIGRAM(S): at 02:06

## 2022-09-12 RX ADMIN — OXYCODONE HYDROCHLORIDE 10 MILLIGRAM(S): 5 TABLET ORAL at 17:01

## 2022-09-12 RX ADMIN — Medication 650 MILLIGRAM(S): at 08:59

## 2022-09-12 RX ADMIN — CYCLOBENZAPRINE HYDROCHLORIDE 5 MILLIGRAM(S): 10 TABLET, FILM COATED ORAL at 23:53

## 2022-09-12 RX ADMIN — PANTOPRAZOLE SODIUM 40 MILLIGRAM(S): 20 TABLET, DELAYED RELEASE ORAL at 04:48

## 2022-09-12 RX ADMIN — SENNA PLUS 2 TABLET(S): 8.6 TABLET ORAL at 21:09

## 2022-09-12 RX ADMIN — Medication 100 MILLIGRAM(S): at 11:48

## 2022-09-12 NOTE — PHYSICAL THERAPY INITIAL EVALUATION ADULT - PERTINENT HX OF CURRENT PROBLEM, REHAB EVAL
46M with PMH: recent syncope and MSSA bacteremia presents to the ED with complaints of persistent back and rib tightness since 1 week. Pt was recently admitted from Aug 15 to Aug 20th, initially evaluated at [facility] for syncope, however found to have MSSA bacteremia. Patient was initially started on IV vancomycin, later switched to IV daptomycin. Endocarditis was r/o with negative ANA, however no source of infection was found. Patient was discharged with appropriate ID follow-up and takes IV daptomycin 500 mg daily via PICC line. At time of admission, patient complained of back pain, located mid-back, dull aching, initially moderate to severe in intensity, aggravated by deep inspiration and movement. Associated with b/l rib tightness With IV abx, the back pain diminished, today patient reports 1/10 pain.Yesterday w outpatient ID doctor, due to persistent back pain, a CT spine was done, which revealed a paraspinal abscess on the ventral margin of T8 and T9, w elevated ESR and CRP, and was referred to the ER for further management. MRICSpine: Degenerative disc protrusions with mild central stenosis but no evidence for inflammatory or infectious enhancement abnormality or cord compressive lesion. Right thyroid nodule enhances. Incidental thyroid nodule described above can be managed per ACR   guidelines. MRIT/Spine: Findings compatible with discitis/osteomyelitis T8-9, with ventral   epidural abscess and mild central stenosis same level. Second small focus of T9-T10 discitis and osteomyelitis, subtle. MRILSpine: Mild central disc protrusion L5-S1.  No enhancing lesion to suggest infection/inflammation of the lumbar spine or upper sacrum.

## 2022-09-12 NOTE — PROGRESS NOTE ADULT - SUBJECTIVE AND OBJECTIVE BOX
Patient seen and evaluated this AM.  Pain well controlled.  Has not been OOB. Denies any numbness/tingling in his LEs.  Denies any feelings of weakness in his LEs.  Denies any bowel/bladder concerns.    ICU Vital Signs Last 24 Hrs  T(C): 36.3 (12 Sep 2022 04:45), Max: 36.7 (11 Sep 2022 13:16)  T(F): 97.4 (12 Sep 2022 04:45), Max: 98 (11 Sep 2022 13:16)  HR: 84 (12 Sep 2022 01:20) (84 - 113)  BP: 96/60 (12 Sep 2022 01:20) (90/60 - 126/76)  BP(mean): 83 (11 Sep 2022 22:25) (75 - 98)  ABP: 126/55 (11 Sep 2022 21:45) (123/63 - 130/61)  ABP(mean): 76 (11 Sep 2022 21:45) (76 - 81)  RR: 16 (12 Sep 2022 04:45) (16 - 18)  SpO2: 98% (12 Sep 2022 04:45) (95% - 100%)      Spine PE:  Dressing c/d/i  Drain in place   No saddle anesthesia    Motor:               L2             L3             L4               L5            S1  R         5/5           5/5          5/5             5/5           5/5  L          5/5          5/5           5/5             5/5           5/5    Sensory:            L2          L3         L4      L5       S1         (0=absent, 1=impaired, 2=normal, NT=not testable)  R         2            2            2        2        2  L          2            2           2        2         2        A/P:  46yMale s/p T6-11 PSF, Lami T8-T10, progressing well.    -FU labs  -WBAT  -HMV output  -Mitchell out in AM - f/u TOV  -Pain control, multimodal  -PT/OT  -Abx treatment per ID  -FU OR cx  -DVT PE ppx- hold in setting of spine surgery and risk of epidural hematoma; SCD okay   -Incentive spirometry  -Care per primary team

## 2022-09-12 NOTE — DISCHARGE NOTE PROVIDER - CARE PROVIDER_API CALL
Benji Balbuena (MD)  [facility]  [address], [address]  [place] [address]  Phone: [phone]  Fax: [phone]  Follow Up Time: 1 week   Benji Balbuena)  [facility]  [address], [address]  [place] [address]  Phone: [phone]  Fax: [phone]  Follow Up Time: 1 week    Devan Austin)  Internal Medicine  [address] [facility] [facility]  [place] [id number]  Phone: [phone]  Fax: [phone]  Established Patient  Follow Up Time: 1 week

## 2022-09-12 NOTE — PROGRESS NOTE ADULT - ASSESSMENT
46 m with staph aureus bacteremia 8/2022 at [facility], was discharged with dapto to complete the course 9/17 but then developed mid back pain and CT was done which showed T8-T9 abscess so pt was sent to the ED   afebrile, normal WBC: ESR: 89, CRP: 40    recent  MRSA bacteremia on dapto to complete the course on 9/17 ( no source identified) now with back pain and T8-T9 abscess on outpatient CT, here MRI showed T8-T9 discitis/osteo and ventral epidural abscess, mild central stenosis, also small focus of T9-T 10 osteo  here blood cx negative and MRSA/MSSA PCR showed MSSA  s/p laminectomy 9/11 but no cultures sent    * c/w dapto to 700 qd, would extend for an 8 week course post surgery until 11/6  * pt is under ID specialist care, Dr. Krause so he will follow up with him  * weekly CBC, CMP, CK while on antibiotics        The above assessment and plan was discussed with the primary team    Steff Fitzgerald MD  contact on teams  After 5pm and on weekends call [phone]

## 2022-09-12 NOTE — DISCHARGE NOTE PROVIDER - NSDCMRMEDTOKEN_GEN_ALL_CORE_FT
DAPTOmycin 500 mg intravenous injection: 500 milligram(s) intravenous once a day   rolling walker:    nafcillin 2 g/100 mL intravenous solution: 2 gram(s) intravenously every 4 hours.     Finish antibiotics on 11/6/22  rolling walker:    nafcillin 2 g/100 mL intravenous solution: 2 gram(s) intravenously every 4 hours.     Finish antibiotics on 11/6/22  oxycodone-acetaminophen 5 mg-325 mg oral tablet: 1 tab(s) orally every 8 hours MDD:3  rolling walker:

## 2022-09-12 NOTE — PHYSICAL THERAPY INITIAL EVALUATION ADULT - GENERAL OBSERVATIONS, REHAB EVAL
pt received seated in chair in NAD +IV, [facility], motivated to work with PT

## 2022-09-12 NOTE — PHYSICAL THERAPY INITIAL EVALUATION ADULT - ADDITIONAL COMMENTS
Pt lives with spouse in private home, 5 steps to enter, 5 steps to living area; pt independent prior to admission with no AD.

## 2022-09-12 NOTE — DISCHARGE NOTE PROVIDER - NSDCCPTREATMENT_GEN_ALL_CORE_FT
PRINCIPAL PROCEDURE  Procedure: MR thorx spine  Findings and Treatment: FINDINGS:  Bones/joints:  Abnormal marrow edema signal enhances at T8-9 vertebral bodies and there is central disc space edema that does not significantly enhance but these findings are indicative osteomyelitis and discitis.  There is associated T8 and T9 level ventral epidural homogeneously enhancing abscess, right eccentric to about 5 mm in depth, with central crescentic T2 bright signal present.  This indents the right ventral thecal sac, is dimension is about 8.5  mm, left thecal sac about 11 mm in AP dimension, abutting the ventral aspect of the cord without cord impingement or compression.  Marrow signal intensity is otherwise normally preserved including in the  region of the facet joints. There is mild focal intra discal edema centrally at T9-10, with adjacent superior T10 endplate edema, and these foci enhance, concerning for a 2nd  area of discitis.  Spinalcord: Normal signal. No cord compression.  Discs/Spinal canal/Neural foramina:  No significant disc disease. No  significant spinal canal stenosis.  Soft tissues: Unremarkable.  IMPRESSION:  Findings compatible with discitis/osteomyelitis T8-9, with ventral   epidural abscess and mild central stenosis same level.  Second small focus of T9-T10 discitis and osteomyelitis, subtle.  Final report: Agree with preliminary report.  < from: MR Thoracic Spine w/wo IV Cont (09.10.22 @ 14:05) >        SECONDARY PROCEDURE  Procedure: MRI cervical spine  Findings and Treatment:   < end of copied text >  FINDINGS:  Bones/joints: Unremarkable.  There is no abnormal enhancement of the   central  canal, epidural spaces, or marrow spaces.  No subluxation.  Spinal cord:  No cord compressive lesion.  Faint T1 bright signal postcontrast atC4 intramedullary cord sagittal   image 7  series 2 is probably artifact, not confirmed on other series.  Also, at the inferior aspect of T1 the intramedullary cord demonstrates   more  intense T1 bright signal on 1 sagittal image 8 series 2 that cannot be  confirmed on other pulses, again very likely artifact.  No thoracic cord   T2  bright edema signal.  Discs/Spinal canal/Neural foramina:  Mild multilevel degenerative disc  protrusions, centrally prominent at C3-C4, C4-C5, C5-C6, and C6-C7 with  associated thickening of the posterior longitudinal ligament.    Protrusions are  mildly right eccentric as well, and there is right uncinate spurring   greater  than left at the same levels.  Mild central canal stenosis at C5-C6 and probably C6-C7.  Vasculature: Expected flow voids in the vertebral arteries.  Soft tissues:  Small enhancing 8 x 11 mm T2 bright nodule of the posterior  upper to mid right thyroid.  IMPRESSION:  Degenerative disc protrusions with mild central stenosis but no evidence   for  inflammatory or infectious enhancement abnormality or cord compressive   lesion.  Right thyroid nodule enhances.  Incidental thyroid nodule described above can be managed per ACR   guidelines  (Perez et. al., JACR 2015):  Recommend ultrasound thyroid for nodules as follows  &lt; 35 years old &gt;= 1cm/  &gt;= 35 years old &lt; 1.5 cm/  &gt;= 35 years old &gt;= 1.5 cm/Suspicious features including abnormal   lymph nodes.  Final report: Agree with preliminary report. No evidence of infection   involving the cervical spine.  < from: MR Cervical Spine w/wo IV Cont (09.10.22 @ 14:05) >      Procedure: MRI lumbar or lumbosacral spinal cord levels  Findings and Treatment:   < end of copied text >  FINDINGS:  Bones/joints:  No abnormal enhancement or marrow edema signal lumbar   bodies,  epidural or other spaces of the spine.  No subluxation or compression   deformity  Spinal cord: Visualized cord, conus medullaris and cauda equina are  unremarkable without compression.  L1-L2:  No significant disc disease. No significant spinal canal   stenosis. No  neural foraminal stenosis.  L2-L3:  No significant disc disease. No significant spinal canal   stenosis. No  neural foraminal stenosis.  L3-L4:  No significant disc disease. No significant spinal canal   stenosis. No  neural foraminal stenosis.  L4-L5:  No significant disc disease. No significant spinal canal   stenosis. No  neural foraminal stenosis.  L5-S1:  Mild central disc protrusion without root impingement. No   significant  spinal canal stenosis. No neural foraminal stenosis.  Soft tissues:  Unremarkable.  IMPRESSION:  Mild central disc protrusion L5-S1.  No enhancing lesion to suggest  infection/inflammation of the lumbar spine or upper sacrum.  Final Report: Agree with preliminary report.  < from: MR Lumbar Spine w/wo IV Cont (09.10.22 @ 14:04) >

## 2022-09-12 NOTE — PROGRESS NOTE ADULT - PROBLEM SELECTOR PLAN 1
detected on outpatient CT imaging  -ID recs appreciated, will continue IV daptomycin 700 mg daily for now  -Ortho spine recs appreciated-       # MRI spine confirmed paraspinal abscess at T8-9      # OR surgery tentatively today   -F/u blood cultures  -Tylenol PRN for back pain  -PT eval when appropriate detected on outpatient CT imaging  -ID recs appreciated, will continue IV daptomycin 700 mg daily for now  -Ortho spine recs appreciated-       # MRI spine confirmed paraspinal abscess at T8-9      # OR surgery performed (9/11): T8-10 laminectomy with T6-T11 PSF  -F/u blood cultures  -Tylenol PRN for back pain  -PT eval: outpt PT  (9/12): Per ID, will need IV daptomycine 700 mg for 8 weeks (9/11 -   Case d/w Dr. Krause (outpt ID physician) and Dr. Fitzgerald (inpt)

## 2022-09-12 NOTE — PROGRESS NOTE ADULT - SUBJECTIVE AND OBJECTIVE BOX
Follow Up:  epidural abscess    Interval History: s/p laminectomy 9/11 but no culture sent, WBC increased    ROS:    All other systems negative    Constitutional: no fever, no chills  Cardiovascular:  no chest pain, no palpitation  Respiratory:  no SOB, no cough  GI:  no abd pain, no vomiting, no diarrhea  urinary: no dysuria, no hematuria, no flank pain  musculoskeletal:  controlled back pain  skin:  no rash  neurology:  no headache, no seizure    Allergies  No Known Allergies        ANTIMICROBIALS:  DAPTOmycin IVPB 700 every 24 hours      OTHER MEDS:  acetaminophen     Tablet .. 650 milliGRAM(s) Oral every 6 hours  aluminum hydroxide/magnesium hydroxide/simethicone Suspension 30 milliLiter(s) Oral every 12 hours PRN  cyclobenzaprine 5 milliGRAM(s) Oral three times a day PRN  HYDROmorphone  Injectable 0.5 milliGRAM(s) SubCutaneous every 6 hours PRN  magnesium hydroxide Suspension 30 milliLiter(s) Oral every 12 hours PRN  multivitamin 1 Tablet(s) Oral daily  ondansetron   Disintegrating Tablet 4 milliGRAM(s) Oral every 6 hours PRN  oxyCODONE    IR 5 milliGRAM(s) Oral every 4 hours PRN  oxyCODONE    IR 10 milliGRAM(s) Oral every 4 hours PRN  pantoprazole    Tablet 40 milliGRAM(s) Oral before breakfast  polyethylene glycol 3350 17 Gram(s) Oral daily  senna 2 Tablet(s) Oral at bedtime  sodium chloride 0.9%. 1000 milliLiter(s) IV Continuous <Continuous>      Vital Signs Last 24 Hrs  T(C): 36.5 (12 Sep 2022 13:14), Max: 36.7 (12 Sep 2022 00:20)  T(F): 97.7 (12 Sep 2022 13:14), Max: 98 (12 Sep 2022 00:20)  HR: 78 (12 Sep 2022 13:14) (70 - 113)  BP: 106/69 (12 Sep 2022 13:14) (90/60 - 126/76)  BP(mean): 83 (11 Sep 2022 22:25) (75 - 98)  RR: 18 (12 Sep 2022 13:14) (16 - 18)  SpO2: 99% (12 Sep 2022 13:14) (95% - 100%)    Parameters below as of 12 Sep 2022 13:14  Patient On (Oxygen Delivery Method): room air        Physical Exam:  General:    NAD,  non toxic  Respiratory:    comfortable on RA  abd:     soft,   BS +,   no tenderness  :   no CVAT,  no suprapubic tenderness,   no  silverio  Musculoskeletal:   no joint swelling  vascular: picc line  Skin:    no rash                          11.7   17.59 )-----------( 388      ( 12 Sep 2022 10:49 )             36.6       09-12    135  |  101  |  14  ----------------------------<  170<H>  4.6   |  24  |  0.71    Ca    9.2      12 Sep 2022 10:49  Phos  2.8     09-12  Mg     2.3     09-12    TPro  7.2  /  Alb  3.9  /  TBili  0.2  /  DBili  x   /  AST  25  /  ALT  17  /  AlkPhos  99  09-12          MICROBIOLOGY:  v  .Blood Blood  09-10-22   No growth to date.  --  --      .Blood Blood  09-09-22   No growth to date.  --  --      .Blood Blood  09-09-22   No growth to date.  --  --                RADIOLOGY:  Images independently visualized and reviewed personally, findings as below  < from: MR Thoracic Spine w/wo IV Cont (09.10.22 @ 14:05) >  IMPRESSION:  Findings compatible with discitis/osteomyelitis T8-9, with ventral   epidural  abscess and mild central stenosis same level.  Second small focus of T9-T10 discitis and osteomyelitis, subtle.    Final report: Agree with preliminary report.    < end of copied text >     Follow Up:  epidural abscess    Interval History: s/p laminectomy 9/11 but no culture sent, WBC increased    ROS:    All other systems negative    Constitutional: no fever, no chills  Cardiovascular:  no chest pain, no palpitation  Respiratory:  no SOB, no cough  GI:  no abd pain, no vomiting, no diarrhea  urinary: no dysuria, no hematuria, no flank pain  musculoskeletal:  controlled back pain  skin:  no rash  neurology:  no headache, no seizure    Allergies  No Known Allergies        ANTIMICROBIALS:  DAPTOmycin IVPB 700 every 24 hours      OTHER MEDS:  acetaminophen     Tablet .. 650 milliGRAM(s) Oral every 6 hours  aluminum hydroxide/magnesium hydroxide/simethicone Suspension 30 milliLiter(s) Oral every 12 hours PRN  cyclobenzaprine 5 milliGRAM(s) Oral three times a day PRN  HYDROmorphone  Injectable 0.5 milliGRAM(s) SubCutaneous every 6 hours PRN  magnesium hydroxide Suspension 30 milliLiter(s) Oral every 12 hours PRN  multivitamin 1 Tablet(s) Oral daily  ondansetron   Disintegrating Tablet 4 milliGRAM(s) Oral every 6 hours PRN  oxyCODONE    IR 5 milliGRAM(s) Oral every 4 hours PRN  oxyCODONE    IR 10 milliGRAM(s) Oral every 4 hours PRN  pantoprazole    Tablet 40 milliGRAM(s) Oral before breakfast  polyethylene glycol 3350 17 Gram(s) Oral daily  senna 2 Tablet(s) Oral at bedtime  sodium chloride 0.9%. 1000 milliLiter(s) IV Continuous <Continuous>      Vital Signs Last 24 Hrs  T(C): 36.5 (12 Sep 2022 13:14), Max: 36.7 (12 Sep 2022 00:20)  T(F): 97.7 (12 Sep 2022 13:14), Max: 98 (12 Sep 2022 00:20)  HR: 78 (12 Sep 2022 13:14) (70 - 113)  BP: 106/69 (12 Sep 2022 13:14) (90/60 - 126/76)  BP(mean): 83 (11 Sep 2022 22:25) (75 - 98)  RR: 18 (12 Sep 2022 13:14) (16 - 18)  SpO2: 99% (12 Sep 2022 13:14) (95% - 100%)    Parameters below as of 12 Sep 2022 13:14  Patient On (Oxygen Delivery Method): room air        Physical Exam:  General:    NAD,  non toxic  Respiratory:    comfortable on RA  abd:     soft,   BS +,   no tenderness  :   no CVAT,  no suprapubic tenderness,   no  silverio  Musculoskeletal:   no joint swelling  vascular: no picc line  Skin:    no rash                          11.7   17.59 )-----------( 388      ( 12 Sep 2022 10:49 )             36.6       09-12    135  |  101  |  14  ----------------------------<  170<H>  4.6   |  24  |  0.71    Ca    9.2      12 Sep 2022 10:49  Phos  2.8     09-12  Mg     2.3     09-12    TPro  7.2  /  Alb  3.9  /  TBili  0.2  /  DBili  x   /  AST  25  /  ALT  17  /  AlkPhos  99  09-12          MICROBIOLOGY:  v  .Blood Blood  09-10-22   No growth to date.  --  --      .Blood Blood  09-09-22   No growth to date.  --  --      .Blood Blood  09-09-22   No growth to date.  --  --                RADIOLOGY:  Images independently visualized and reviewed personally, findings as below  < from: MR Thoracic Spine w/wo IV Cont (09.10.22 @ 14:05) >  IMPRESSION:  Findings compatible with discitis/osteomyelitis T8-9, with ventral   epidural  abscess and mild central stenosis same level.  Second small focus of T9-T10 discitis and osteomyelitis, subtle.    Final report: Agree with preliminary report.    < end of copied text >

## 2022-09-12 NOTE — PROGRESS NOTE ADULT - PROBLEM SELECTOR PLAN 3
MSSA bacteremia tx at [facility]   TTE negative, no clear source at that time, now currently tx for paraspinal abscess  -Continue IV daptomycin 700 mg daily   -ID consult in AM   -F/u blood cultures MSSA bacteremia tx at [facility]   TTE negative, no clear source at that time, now currently tx for paraspinal abscess  -Continue IV daptomycin 700 mg daily   -ID consult in AM   -F/u blood cultures  (9/12): Per ID, daptomycin for 8 weeks s/p OR procedure (9/11 -   Will need PICC vs office dapto with Dr. Krause

## 2022-09-12 NOTE — DISCHARGE NOTE PROVIDER - HOSPITAL COURSE
46M with PMH of recent syncope (vaso-vagal?) and MRSA bacteremia with an unknown source, currently receiving IV daptomycin EOD 09/17 present to the ED for outpatient CT finding concerning for paraspinal abscess at the T8-9 level. No neurological deficits with mild midback pain. Pt was admitted for surgical intervention. ID increased daptomycin dose from 500 to 700 mg QD. 3 sets of BCx came back negative. MRI-spine confirmed ventral dural abscess at T8-9, no cord compression.  Pt received T6-11 PSF, Lami T8-T10 per ortho spine. Pt tolerated well to surgery ? .  Started PT/OT ?.     At this point, pt was medically optimized and ready for discharge home.    46M with PMH of recent syncope (vaso-vagal?) and MRSA bacteremia with an unknown source, currently receiving IV daptomycin EOD 09/17 present to the ED for outpatient CT finding concerning for paraspinal abscess at the T8-9 level. No neurological deficits with mild midback pain. Pt was admitted for surgical intervention. ID increased daptomycin dose from 500 to 700 mg QD. 3 sets of BCx came back negative. MRI-spine confirmed ventral dural abscess at T8-9, no cord compression. Pt received T6-11 PSF, Lami T8-T10 per ortho spine. Pt tolerated well to surgery, independently ambulatory without significant pain. The patient is recommended to go home with outpatient PT. He is tolerating PO diet. He is planned to receive intravenous daptomycin until 11/6 per Infectious Disease team.     At this point, pt was medically optimized and ready for discharge home.    46M with PMH of recent syncope (vaso-vagal?) and MRSA bacteremia with an unknown source, currently receiving IV daptomycin EOD 09/17 present to the ED for outpatient CT finding concerning for paraspinal abscess at the T8-9 level. No neurological deficits with mild midback pain. Pt was admitted for surgical intervention. ID increased daptomycin dose from 500 to 700 mg QD. 3 sets of BCx came back negative. MRI-spine confirmed ventral dural abscess at T8-9, no cord compression. Pt received T6-11 PSF, Lami T8-T10 per ortho spine. Pt tolerated well to surgery, independently ambulatory without significant pain. The patient is recommended to go home with outpatient PT. He is tolerating PO diet. He is planned to receive intravenous daptomycin until 11/6 per Infectious Disease team. PICC line placed for intravenous antibiotics administration at home upon discharge with follow up with PCP, Infectious Disease, and orthopedic surgery in 1 week.     At this point, pt was medically optimized and ready for discharge home.    46M with PMH of recent syncope (vaso-vagal?) and MRSA bacteremia with an unknown source, currently receiving IV daptomycin EOD 09/17 present to the ED for outpatient CT finding concerning for paraspinal abscess at the T8-9 level. No neurological deficits with mild midback pain. Pt was admitted for surgical intervention. ID increased daptomycin dose from 500 to 700 mg QD. 3 sets of BCx came back negative. MRI-spine confirmed ventral dural abscess at T8-9, no cord compression. Pt received T6-11 PSF, Lami T8-T10 per ortho spine. Pt tolerated well to surgery, independently ambulatory without significant pain. The patient is recommended to go home with outpatient PT. He is tolerating PO diet. He is planned to receive intravenous nafcillin until 11/6 per Infectious Disease team. PICC line placed for intravenous antibiotics administration at home upon discharge with follow up with PCP, Infectious Disease, and orthopedic surgery in 1 week.     At this point, pt was medically optimized and ready for discharge home.    46M with PMH of recent syncope (vaso-vagal?) and MSSA bacteremia with an unknown source, currently receiving IV daptomycin EOD 09/17 present to the ED for outpatient CT finding concerning for paraspinal abscess at the T8-9 level. No neurological deficits with mild midback pain. Pt was admitted for surgical intervention. ID increased daptomycin dose from 500 to 700 mg QD. 3 sets of BCx came back negative. MRI-spine confirmed ventral dural abscess at T8-9, no cord compression. Pt received T6-11 PSF, Lami T8-T10 per ortho spine. Pt tolerated well to surgery, independently ambulatory without significant pain. The patient is recommended to go home with outpatient PT. He is tolerating PO diet. He is planned to receive intravenous nafcillin until 11/6 per Infectious Disease team. PICC line placed for intravenous antibiotics administration at home upon discharge with follow up with PCP, Infectious Disease, and orthopedic surgery in 1 week.     At this point, pt was medically optimized and ready for discharge home with outpatient PT.

## 2022-09-12 NOTE — PROGRESS NOTE ADULT - ATTENDING COMMENTS
Patient seen and examined   Labs and vitals are reviewed  WBC 17 K almost same as yesterday    46 with PMHx of staph bacteremia was on Daptomycin presenting with epidural abscess s/p laminectomy and fusion(T6-11)  Leukocytosis - on Abx will monitor   analgesia   PT   DVT prophylaxis - SCD  d/w ID attending Dr Chen needs 8 weeks of IV Abx after surgery

## 2022-09-12 NOTE — DISCHARGE NOTE PROVIDER - NSDCCPCAREPLAN_GEN_ALL_CORE_FT
PRINCIPAL DISCHARGE DIAGNOSIS  Diagnosis: Paraspinal abscess  Assessment and Plan of Treatment: You came to the hospital for CT findings suggesting paraspinal abscess done in the outside clinics, admitted for surgical evalutaion by orthopedic spine surgeon.  You had MRI of whole spine which showed abscess within dural space inside your thoracic spinal donna without cord compression.  You agreed on surgical intervention to treat spinal infection likely disseminated from your bacterial infection in the blood. You tolerated well after surgery and will continue getting IV antibiotics for blood infection. You will need to follow up with orthopedic surgery  within 1 week of discharge date for postoperative check-up.      SECONDARY DISCHARGE DIAGNOSES  Diagnosis: Staphylococcus aureus bacteremia  Assessment and Plan of Treatment: During this hospital stay, infectious disease doctor increased your daptomycin IV antibiotics from 500mg daily to 700mg daily and you were continued on treatment of bacteriemia with end of date on ?. Please follow up with your infectious disease doctor within 1 week of discharge date in terms of antibiotics dose and length of treatment.     PRINCIPAL DISCHARGE DIAGNOSIS  Diagnosis: Paraspinal abscess  Assessment and Plan of Treatment: You came to the hospital for CT findings suggesting paraspinal abscess done in the outside clinics, admitted for surgical evalutaion by orthopedic spine surgeon.  You had MRI of whole spine which showed abscess within dural space inside your thoracic spinal donna without cord compression.  You agreed on surgical intervention to treat spinal infection likely disseminated from your bacterial infection in the blood. You tolerated well after surgery and will continue getting IV antibiotics for blood infection until November 6th, as determined by your Infectious Disease doctors. You will need to follow up with orthopedic surgery within 1 week of discharge date for postoperative check-up.      SECONDARY DISCHARGE DIAGNOSES  Diagnosis: Staphylococcus aureus bacteremia  Assessment and Plan of Treatment: During this hospital stay, infectious disease doctor increased your daptomycin IV antibiotics from 500mg daily to 700mg daily and you were continued on treatment of bacteriemia with end of date on 11/6/2022. Please follow up with your infectious disease doctor within 1 week of discharge date in terms of antibiotics dose and length of treatment. You will require weekly blood draws to monitor your labs while on daptomycin.     PRINCIPAL DISCHARGE DIAGNOSIS  Diagnosis: Paraspinal abscess  Assessment and Plan of Treatment: You came to the hospital for CT findings suggesting paraspinal abscess done in the outside clinics, admitted for surgical evalutaion by orthopedic spine surgeon.  You had MRI of whole spine which showed abscess within dural space inside your thoracic spinal donna without cord compression.  You agreed on surgical intervention to treat spinal infection likely disseminated from your bacterial infection in the blood. You tolerated well after surgery and will continue getting IV antibiotics for blood infection until November 6th, as determined by your Infectious Disease doctors. You will need to follow up with orthopedic surgery within 1 week of discharge date for postoperative check-up.      SECONDARY DISCHARGE DIAGNOSES  Diagnosis: Staphylococcus aureus bacteremia  Assessment and Plan of Treatment: During this hospital stay, infectious disease doctor increased your daptomycin IV antibiotics from 500mg daily to 700mg daily> After consulting the ID team, your antibiotic was switched from daptomycin to nafcillin and you were continued on treatment of bacteriemia with end of date on 11/6/2022. Please follow up with your infectious disease doctor within 1 week of discharge date in terms of antibiotics dose and length of treatment. You will require weekly blood draws to monitor your labs while on daptomycin.

## 2022-09-12 NOTE — DISCHARGE NOTE PROVIDER - PROVIDER TOKENS
PROVIDER:[TOKEN:[[id number]],FOLLOWUP:[1 week]] PROVIDER:[TOKEN:[[id number]],FOLLOWUP:[1 week]],PROVIDER:[TOKEN:[[id number]],FOLLOWUP:[1 week],ESTABLISHEDPATIENT:[T]]

## 2022-09-12 NOTE — PROGRESS NOTE ADULT - SUBJECTIVE AND OBJECTIVE BOX
Patient is a 46y old  Male who presents with a chief complaint of Paraspinal abscess on CT (11 Sep 2022 21:05)      SUBJECTIVE / OVERNIGHT EVENTS:    MEDICATIONS  (STANDING):  acetaminophen     Tablet .. 650 milliGRAM(s) Oral every 6 hours  ceFAZolin   IVPB 2000 milliGRAM(s) IV Intermittent every 8 hours  DAPTOmycin IVPB 500 milliGRAM(s) IV Intermittent every 24 hours  multivitamin 1 Tablet(s) Oral daily  pantoprazole    Tablet 40 milliGRAM(s) Oral before breakfast  polyethylene glycol 3350 17 Gram(s) Oral daily  senna 2 Tablet(s) Oral at bedtime  sodium chloride 0.9%. 1000 milliLiter(s) (75 mL/Hr) IV Continuous <Continuous>    MEDICATIONS  (PRN):  aluminum hydroxide/magnesium hydroxide/simethicone Suspension 30 milliLiter(s) Oral every 12 hours PRN Indigestion  cyclobenzaprine 5 milliGRAM(s) Oral three times a day PRN Muscle Spasm  HYDROmorphone  Injectable 0.5 milliGRAM(s) SubCutaneous every 6 hours PRN Severe Pain (7 - 10)  magnesium hydroxide Suspension 30 milliLiter(s) Oral every 12 hours PRN Constipation  ondansetron   Disintegrating Tablet 4 milliGRAM(s) Oral every 6 hours PRN Nausea and/or Vomiting  oxyCODONE    IR 5 milliGRAM(s) Oral every 4 hours PRN Mild Pain (1 - 3)  oxyCODONE    IR 10 milliGRAM(s) Oral every 4 hours PRN Moderate Pain (4 - 6)      Vital Signs Last 24 Hrs  T(C): 36.3 (12 Sep 2022 04:45), Max: 36.7 (11 Sep 2022 13:16)  T(F): 97.4 (12 Sep 2022 04:45), Max: 98 (11 Sep 2022 13:16)  HR: 84 (12 Sep 2022 01:20) (84 - 113)  BP: 96/60 (12 Sep 2022 01:20) (90/60 - 126/76)  BP(mean): 83 (11 Sep 2022 22:25) (75 - 98)  RR: 16 (12 Sep 2022 04:45) (16 - 18)  SpO2: 98% (12 Sep 2022 04:45) (95% - 100%)    Parameters below as of 12 Sep 2022 04:45  Patient On (Oxygen Delivery Method): room air      CAPILLARY BLOOD GLUCOSE        I&O's Summary    11 Sep 2022 07:01  -  12 Sep 2022 05:20  --------------------------------------------------------  IN: 350 mL / OUT: 675 mL / NET: -325 mL        PHYSICAL EXAM:  GENERAL: NAD, well-developed  HEAD:  Atraumatic, Normocephalic  EYES: EOMI, PERRLA, conjunctiva and sclera clear  NECK: Supple, No JVD  CHEST/LUNG: Clear to auscultation bilaterally; No wheeze  HEART: Regular rate and rhythm; No murmurs, rubs, or gallops  ABDOMEN: Soft, Nontender, Nondistended; Bowel sounds present  EXTREMITIES:  2+ Peripheral Pulses, No clubbing, cyanosis, or edema  PSYCH: AAOx3  NEUROLOGY: non-focal  SKIN: No rashes or lesions    LABS:                        11.0   7.35  )-----------( 289      ( 11 Sep 2022 20:56 )             34.0     09-11    144  |  109<H>  |  12  ----------------------------<  147<H>  4.1   |  22  |  0.79    Ca    8.9      11 Sep 2022 20:56  Phos  2.9     09-11  Mg     2.2     09-11    TPro  7.3  /  Alb  3.7  /  TBili  0.5  /  DBili  x   /  AST  16  /  ALT  16  /  AlkPhos  99  09-11    PT/INR - ( 11 Sep 2022 01:01 )   PT: 14.9 sec;   INR: 1.29 ratio         PTT - ( 11 Sep 2022 01:01 )  PTT:27.3 sec  CARDIAC MARKERS ( 11 Sep 2022 01:03 )  x     / x     / 50 U/L / x     / x      CARDIAC MARKERS ( 10 Sep 2022 11:40 )  x     / x     / 52 U/L / x     / x              RADIOLOGY & ADDITIONAL TESTS:    Imaging Personally Reviewed:    Consultant(s) Notes Reviewed:      Care Discussed with Consultants/Other Providers:   Patient is a 46y old  Male who presents with a chief complaint of Paraspinal abscess on CT (11 Sep 2022 21:05)      SUBJECTIVE / OVERNIGHT EVENTS: Pt s/p T8-T10 laminectomy and T6-T11 PSF. Pt seen and examined with wife at bedside this morning. Pt is in good spirits and doing well. Pt endorses mild (2 out of 10) mid/lower back pain that worsens with cough and movement. Denies headache, nausea, vomiting, CP, palpitations, SOB, changes in BM or dysuria.     MEDICATIONS  (STANDING):  acetaminophen     Tablet .. 650 milliGRAM(s) Oral every 6 hours  ceFAZolin   IVPB 2000 milliGRAM(s) IV Intermittent every 8 hours  DAPTOmycin IVPB 500 milliGRAM(s) IV Intermittent every 24 hours  multivitamin 1 Tablet(s) Oral daily  pantoprazole    Tablet 40 milliGRAM(s) Oral before breakfast  polyethylene glycol 3350 17 Gram(s) Oral daily  senna 2 Tablet(s) Oral at bedtime  sodium chloride 0.9%. 1000 milliLiter(s) (75 mL/Hr) IV Continuous <Continuous>    MEDICATIONS  (PRN):  aluminum hydroxide/magnesium hydroxide/simethicone Suspension 30 milliLiter(s) Oral every 12 hours PRN Indigestion  cyclobenzaprine 5 milliGRAM(s) Oral three times a day PRN Muscle Spasm  HYDROmorphone  Injectable 0.5 milliGRAM(s) SubCutaneous every 6 hours PRN Severe Pain (7 - 10)  magnesium hydroxide Suspension 30 milliLiter(s) Oral every 12 hours PRN Constipation  ondansetron   Disintegrating Tablet 4 milliGRAM(s) Oral every 6 hours PRN Nausea and/or Vomiting  oxyCODONE    IR 5 milliGRAM(s) Oral every 4 hours PRN Mild Pain (1 - 3)  oxyCODONE    IR 10 milliGRAM(s) Oral every 4 hours PRN Moderate Pain (4 - 6)      Vital Signs Last 24 Hrs  T(C): 36.3 (12 Sep 2022 04:45), Max: 36.7 (11 Sep 2022 13:16)  T(F): 97.4 (12 Sep 2022 04:45), Max: 98 (11 Sep 2022 13:16)  HR: 84 (12 Sep 2022 01:20) (84 - 113)  BP: 96/60 (12 Sep 2022 01:20) (90/60 - 126/76)  BP(mean): 83 (11 Sep 2022 22:25) (75 - 98)  RR: 16 (12 Sep 2022 04:45) (16 - 18)  SpO2: 98% (12 Sep 2022 04:45) (95% - 100%)    Parameters below as of 12 Sep 2022 04:45  Patient On (Oxygen Delivery Method): room air      CAPILLARY BLOOD GLUCOSE        I&O's Summary    11 Sep 2022 07:01  -  12 Sep 2022 05:20  --------------------------------------------------------  IN: 350 mL / OUT: 675 mL / NET: -325 mL        PHYSICAL EXAM:  GENERAL: NAD, well-developed  HEAD:  Atraumatic, Normocephalic  EYES:  conjunctiva and sclera clear  NECK: Supple, No JVD  CHEST/LUNG: Clear to auscultation bilaterally; No wheeze  HEART: Regular rate and rhythm; No murmurs, rubs, or gallops  ABDOMEN: Soft, Nontender, Nondistended; Bowel sounds present  EXTREMITIES:  2+ Peripheral Pulses, No clubbing, cyanosis, or edema  PSYCH: AAOx3  NEUROLOGY: non-focal  SKIN: No rashes or lesions    LABS:                        11.0   7.35  )-----------( 289      ( 11 Sep 2022 20:56 )             34.0     09-11    144  |  109<H>  |  12  ----------------------------<  147<H>  4.1   |  22  |  0.79    Ca    8.9      11 Sep 2022 20:56  Phos  2.9     09-11  Mg     2.2     09-11    TPro  7.3  /  Alb  3.7  /  TBili  0.5  /  DBili  x   /  AST  16  /  ALT  16  /  AlkPhos  99  09-11    PT/INR - ( 11 Sep 2022 01:01 )   PT: 14.9 sec;   INR: 1.29 ratio         PTT - ( 11 Sep 2022 01:01 )  PTT:27.3 sec  CARDIAC MARKERS ( 11 Sep 2022 01:03 )  x     / x     / 50 U/L / x     / x      CARDIAC MARKERS ( 10 Sep 2022 11:40 )  x     / x     / 52 U/L / x     / x              RADIOLOGY & ADDITIONAL TESTS:    Imaging Personally Reviewed:    Consultant(s) Notes Reviewed:    ORTHO  46yMale s/p T6-11 PSF, Lami T8-T10, progressing well.    -FU labs  -WBAT  -HMV output  -Mitchell out in AM - f/u TOV  -Pain control, multimodal  -PT/OT  -Abx treatment per ID  -FU OR cx  -DVT PE ppx- hold in setting of spine surgery and risk of epidural hematoma; SCD okay   -Incentive spirometry  -Care per primary team    PT  Outpt PT      Care Discussed with Consultants/Other Providers:   Patient is a 46y old  Male who presents with a chief complaint of Paraspinal abscess on CT (11 Sep 2022 21:05)      SUBJECTIVE / OVERNIGHT EVENTS: Pt s/p T8-T10 laminectomy and T6-T11 PSF. Pt seen and examined with wife at bedside this morning. Pt is in good spirits and doing well. Pt endorses mild (2 out of 10) mid/lower back pain that worsens with cough and movement. Denies headache, nausea, vomiting, CP, palpitations, SOB, changes in BM or dysuria.     MEDICATIONS  (STANDING):  acetaminophen     Tablet .. 650 milliGRAM(s) Oral every 6 hours  ceFAZolin   IVPB 2000 milliGRAM(s) IV Intermittent every 8 hours  DAPTOmycin IVPB 500 milliGRAM(s) IV Intermittent every 24 hours  multivitamin 1 Tablet(s) Oral daily  pantoprazole    Tablet 40 milliGRAM(s) Oral before breakfast  polyethylene glycol 3350 17 Gram(s) Oral daily  senna 2 Tablet(s) Oral at bedtime  sodium chloride 0.9%. 1000 milliLiter(s) (75 mL/Hr) IV Continuous <Continuous>    MEDICATIONS  (PRN):  aluminum hydroxide/magnesium hydroxide/simethicone Suspension 30 milliLiter(s) Oral every 12 hours PRN Indigestion  cyclobenzaprine 5 milliGRAM(s) Oral three times a day PRN Muscle Spasm  HYDROmorphone  Injectable 0.5 milliGRAM(s) SubCutaneous every 6 hours PRN Severe Pain (7 - 10)  magnesium hydroxide Suspension 30 milliLiter(s) Oral every 12 hours PRN Constipation  ondansetron   Disintegrating Tablet 4 milliGRAM(s) Oral every 6 hours PRN Nausea and/or Vomiting  oxyCODONE    IR 5 milliGRAM(s) Oral every 4 hours PRN Mild Pain (1 - 3)  oxyCODONE    IR 10 milliGRAM(s) Oral every 4 hours PRN Moderate Pain (4 - 6)      Vital Signs Last 24 Hrs  T(C): 36.3 (12 Sep 2022 04:45), Max: 36.7 (11 Sep 2022 13:16)  T(F): 97.4 (12 Sep 2022 04:45), Max: 98 (11 Sep 2022 13:16)  HR: 84 (12 Sep 2022 01:20) (84 - 113)  BP: 96/60 (12 Sep 2022 01:20) (90/60 - 126/76)  BP(mean): 83 (11 Sep 2022 22:25) (75 - 98)  RR: 16 (12 Sep 2022 04:45) (16 - 18)  SpO2: 98% (12 Sep 2022 04:45) (95% - 100%)    Parameters below as of 12 Sep 2022 04:45  Patient On (Oxygen Delivery Method): room air      CAPILLARY BLOOD GLUCOSE        I&O's Summary    11 Sep 2022 07:01  -  12 Sep 2022 05:20  --------------------------------------------------------  IN: 350 mL / OUT: 675 mL / NET: -325 mL        PHYSICAL EXAM:  GENERAL: NAD, well-developed  HEAD:  Atraumatic, Normocephalic  EYES:  pink conjunctiva and anicteric sclera   NECK: Supple, No JVD  CHEST/LUNG: Clear to auscultation bilaterally; No wheeze  HEART: Regular rate and rhythm; No murmurs, rubs, or gallops  ABDOMEN: Soft, Nontender, Nondistended; Bowel sounds present  EXTREMITIES:  2+ Peripheral Pulses, No clubbing, cyanosis, or edema  PSYCH: AAOx3    LABS:                        11.0   7.35  )-----------( 289      ( 11 Sep 2022 20:56 )             34.0     09-11    144  |  109<H>  |  12  ----------------------------<  147<H>  4.1   |  22  |  0.79    Ca    8.9      11 Sep 2022 20:56  Phos  2.9     09-11  Mg     2.2     09-11    TPro  7.3  /  Alb  3.7  /  TBili  0.5  /  DBili  x   /  AST  16  /  ALT  16  /  AlkPhos  99  09-11    PT/INR - ( 11 Sep 2022 01:01 )   PT: 14.9 sec;   INR: 1.29 ratio         PTT - ( 11 Sep 2022 01:01 )  PTT:27.3 sec  CARDIAC MARKERS ( 11 Sep 2022 01:03 )  x     / x     / 50 U/L / x     / x      CARDIAC MARKERS ( 10 Sep 2022 11:40 )  x     / x     / 52 U/L / x     / x              RADIOLOGY & ADDITIONAL TESTS:    Imaging Personally Reviewed:    Consultant(s) Notes Reviewed:    ORTHO  46yMale s/p T6-11 PSF, Lami T8-T10, progressing well.    -FU labs  -WBAT  -HMV output  -Mitchell out in AM - f/u TOV  -Pain control, multimodal  -PT/OT  -Abx treatment per ID  -FU OR cx  -DVT PE ppx- hold in setting of spine surgery and risk of epidural hematoma; SCD okay   -Incentive spirometry  -Care per primary team    PT  Outpt PT      Care Discussed with Consultants/Other Providers:

## 2022-09-12 NOTE — OCCUPATIONAL THERAPY INITIAL EVALUATION ADULT - PERTINENT HX OF CURRENT PROBLEM, REHAB EVAL
46M with PMH: recent syncope and MSSA bacteremia presents to the ED with complaints of persistent back and rib tightness since 1 week. Pt was recently admitted from Aug 15 to Aug 20th, initially evaluated at [facility] for syncope, however found to have MSSA bacteremia. Patient was initially started on IV vancomycin, later switched to IV daptomycin. Endocarditis was r/o with negative ANA, however no source of infection was found. Patient was discharged with appropriate ID follow-up and takes IV daptomycin 500 mg daily via PICC line. At time of admission, patient complained of back pain, located mid-back, dull aching, initially moderate to severe in intensity, aggravated by deep inspiration and movement. Associated with b/l rib tightness With IV abx, the back pain diminished, today patient reports 1/10 pain. Yesterday w outpatient ID doctor, due to persistent back pain, a CT spine was done, which revealed a paraspinal abscess on the ventral margin of T8 and T9, w elevated ESR and CRP, and was referred to the ER for further management. Pt now s/p T6-T11 PSF and T8-T10 laminectomy.

## 2022-09-12 NOTE — DISCHARGE NOTE PROVIDER - NSDCFUADDAPPT_GEN_ALL_CORE_FT
Please follow up with your orthopedic surgeon (Dr. Balbuena) and Infectious Disease clinician (Dr. Austin) in 1 week Please follow up with your orthopedic surgeon (Dr. Balbuena) Infectious Disease clinician (Dr. Austin) and your PCP in 1 week Please follow up with your orthopedic surgeon (Dr. Balbuena) Infectious Disease clinician (Dr. Austin) and your PCP in 1 week    Please ask your PCP about further evaluation of an incidental thyroid nodule seen on MRI here.

## 2022-09-13 LAB
ALBUMIN SERPL ELPH-MCNC: 3.5 G/DL — SIGNIFICANT CHANGE UP (ref 3.3–5)
ALP SERPL-CCNC: 94 U/L — SIGNIFICANT CHANGE UP (ref 40–120)
ALT FLD-CCNC: 17 U/L — SIGNIFICANT CHANGE UP (ref 10–45)
ANION GAP SERPL CALC-SCNC: 7 MMOL/L — SIGNIFICANT CHANGE UP (ref 5–17)
APTT BLD: 25.5 SEC — LOW (ref 27.5–35.5)
AST SERPL-CCNC: 28 U/L — SIGNIFICANT CHANGE UP (ref 10–40)
BILIRUB SERPL-MCNC: 0.3 MG/DL — SIGNIFICANT CHANGE UP (ref 0.2–1.2)
BUN SERPL-MCNC: 10 MG/DL — SIGNIFICANT CHANGE UP (ref 7–23)
CALCIUM SERPL-MCNC: 8.9 MG/DL — SIGNIFICANT CHANGE UP (ref 8.4–10.5)
CHLORIDE SERPL-SCNC: 101 MMOL/L — SIGNIFICANT CHANGE UP (ref 96–108)
CO2 SERPL-SCNC: 28 MMOL/L — SIGNIFICANT CHANGE UP (ref 22–31)
CREAT SERPL-MCNC: 0.81 MG/DL — SIGNIFICANT CHANGE UP (ref 0.5–1.3)
CRP SERPL-MCNC: 60 MG/L — HIGH (ref 0–4)
EGFR: 109 ML/MIN/1.73M2 — SIGNIFICANT CHANGE UP
ERYTHROCYTE [SEDIMENTATION RATE] IN BLOOD: 86 MM/HR — HIGH (ref 0–15)
GLUCOSE SERPL-MCNC: 103 MG/DL — HIGH (ref 70–99)
HCT VFR BLD CALC: 35 % — LOW (ref 39–50)
HGB BLD-MCNC: 11.1 G/DL — LOW (ref 13–17)
INR BLD: 1.22 RATIO — HIGH (ref 0.88–1.16)
MAGNESIUM SERPL-MCNC: 2.1 MG/DL — SIGNIFICANT CHANGE UP (ref 1.6–2.6)
MCHC RBC-ENTMCNC: 28 PG — SIGNIFICANT CHANGE UP (ref 27–34)
MCHC RBC-ENTMCNC: 31.7 GM/DL — LOW (ref 32–36)
MCV RBC AUTO: 88.2 FL — SIGNIFICANT CHANGE UP (ref 80–100)
NRBC # BLD: 0 /100 WBCS — SIGNIFICANT CHANGE UP (ref 0–0)
PHOSPHATE SERPL-MCNC: 2.7 MG/DL — SIGNIFICANT CHANGE UP (ref 2.5–4.5)
PLATELET # BLD AUTO: 339 K/UL — SIGNIFICANT CHANGE UP (ref 150–400)
POTASSIUM SERPL-MCNC: 4.3 MMOL/L — SIGNIFICANT CHANGE UP (ref 3.5–5.3)
POTASSIUM SERPL-SCNC: 4.3 MMOL/L — SIGNIFICANT CHANGE UP (ref 3.5–5.3)
PROT SERPL-MCNC: 7.1 G/DL — SIGNIFICANT CHANGE UP (ref 6–8.3)
PROTHROM AB SERPL-ACNC: 14.2 SEC — HIGH (ref 10.5–13.4)
RBC # BLD: 3.97 M/UL — LOW (ref 4.2–5.8)
RBC # FLD: 12.9 % — SIGNIFICANT CHANGE UP (ref 10.3–14.5)
SODIUM SERPL-SCNC: 136 MMOL/L — SIGNIFICANT CHANGE UP (ref 135–145)
WBC # BLD: 12.31 K/UL — HIGH (ref 3.8–10.5)
WBC # FLD AUTO: 12.31 K/UL — HIGH (ref 3.8–10.5)

## 2022-09-13 PROCEDURE — 99232 SBSQ HOSP IP/OBS MODERATE 35: CPT

## 2022-09-13 PROCEDURE — 99232 SBSQ HOSP IP/OBS MODERATE 35: CPT | Mod: GC

## 2022-09-13 RX ORDER — DAPTOMYCIN 500 MG/10ML
700 INJECTION, POWDER, LYOPHILIZED, FOR SOLUTION INTRAVENOUS
Qty: 0 | Refills: 0 | DISCHARGE
End: 2022-11-06

## 2022-09-13 RX ORDER — DAPTOMYCIN 500 MG/10ML
500 INJECTION, POWDER, LYOPHILIZED, FOR SOLUTION INTRAVENOUS
Qty: 0 | Refills: 0 | DISCHARGE
End: 2022-09-17

## 2022-09-13 RX ORDER — DAPTOMYCIN 500 MG/10ML
700 INJECTION, POWDER, LYOPHILIZED, FOR SOLUTION INTRAVENOUS
Qty: 1 | Refills: 0
Start: 2022-09-13 | End: 2022-11-11

## 2022-09-13 RX ORDER — ENOXAPARIN SODIUM 100 MG/ML
40 INJECTION SUBCUTANEOUS EVERY 24 HOURS
Refills: 0 | Status: DISCONTINUED | OUTPATIENT
Start: 2022-09-13 | End: 2022-09-18

## 2022-09-13 RX ADMIN — OXYCODONE HYDROCHLORIDE 10 MILLIGRAM(S): 5 TABLET ORAL at 05:59

## 2022-09-13 RX ADMIN — OXYCODONE HYDROCHLORIDE 10 MILLIGRAM(S): 5 TABLET ORAL at 02:00

## 2022-09-13 RX ADMIN — Medication 650 MILLIGRAM(S): at 02:00

## 2022-09-13 RX ADMIN — OXYCODONE HYDROCHLORIDE 10 MILLIGRAM(S): 5 TABLET ORAL at 20:02

## 2022-09-13 RX ADMIN — Medication 650 MILLIGRAM(S): at 22:25

## 2022-09-13 RX ADMIN — CYCLOBENZAPRINE HYDROCHLORIDE 5 MILLIGRAM(S): 10 TABLET, FILM COATED ORAL at 16:53

## 2022-09-13 RX ADMIN — ENOXAPARIN SODIUM 40 MILLIGRAM(S): 100 INJECTION SUBCUTANEOUS at 12:19

## 2022-09-13 RX ADMIN — Medication 1 TABLET(S): at 12:18

## 2022-09-13 RX ADMIN — OXYCODONE HYDROCHLORIDE 10 MILLIGRAM(S): 5 TABLET ORAL at 13:45

## 2022-09-13 RX ADMIN — PANTOPRAZOLE SODIUM 40 MILLIGRAM(S): 20 TABLET, DELAYED RELEASE ORAL at 05:27

## 2022-09-13 RX ADMIN — Medication 650 MILLIGRAM(S): at 21:39

## 2022-09-13 RX ADMIN — DAPTOMYCIN 128 MILLIGRAM(S): 500 INJECTION, POWDER, LYOPHILIZED, FOR SOLUTION INTRAVENOUS at 11:11

## 2022-09-13 RX ADMIN — OXYCODONE HYDROCHLORIDE 10 MILLIGRAM(S): 5 TABLET ORAL at 14:30

## 2022-09-13 RX ADMIN — OXYCODONE HYDROCHLORIDE 10 MILLIGRAM(S): 5 TABLET ORAL at 02:30

## 2022-09-13 RX ADMIN — OXYCODONE HYDROCHLORIDE 10 MILLIGRAM(S): 5 TABLET ORAL at 23:11

## 2022-09-13 RX ADMIN — OXYCODONE HYDROCHLORIDE 10 MILLIGRAM(S): 5 TABLET ORAL at 18:37

## 2022-09-13 RX ADMIN — Medication 650 MILLIGRAM(S): at 02:46

## 2022-09-13 RX ADMIN — POLYETHYLENE GLYCOL 3350 17 GRAM(S): 17 POWDER, FOR SOLUTION ORAL at 12:17

## 2022-09-13 RX ADMIN — SENNA PLUS 2 TABLET(S): 8.6 TABLET ORAL at 21:39

## 2022-09-13 RX ADMIN — OXYCODONE HYDROCHLORIDE 10 MILLIGRAM(S): 5 TABLET ORAL at 23:41

## 2022-09-13 RX ADMIN — CYCLOBENZAPRINE HYDROCHLORIDE 5 MILLIGRAM(S): 10 TABLET, FILM COATED ORAL at 12:18

## 2022-09-13 RX ADMIN — OXYCODONE HYDROCHLORIDE 10 MILLIGRAM(S): 5 TABLET ORAL at 06:30

## 2022-09-13 NOTE — PROGRESS NOTE ADULT - PROBLEM SELECTOR PLAN 1
detected on outpatient CT imaging  -ID recs appreciated, will continue IV daptomycin 700 mg daily for now  -Ortho spine recs appreciated-       # MRI spine confirmed paraspinal abscess at T8-9      # OR surgery performed (9/11): T8-10 laminectomy with T6-T11 PSF  -F/u blood cultures  -Tylenol PRN for back pain  -PT eval: outpt PT  (9/12): Per ID, will need IV daptomycine 700 mg for 8 weeks (9/11 -   Case d/w Dr. Krause (outpt ID physician) and Dr. Fitzgerald (inpt)

## 2022-09-13 NOTE — PROGRESS NOTE ADULT - ATTENDING COMMENTS
Patient seen and examined in AM  Labs and vitals are reviewed  pain is better   drain is intact   no fever  WBC 12 K   CRP - 60 ( after surgery)  46 with PMHx of MRSA bacteremia was on Daptomycin presenting with epidural abscess s/p laminectomy and fusion(T6-11)  would need extended treatment with Daptomycin   ID note appreciated  DVT prophylaxis - okay to start Lovenox  Noted to have thyroid nodule which I d/w patient and he has known history and had biopsy in the past.  hoping that drain will come out today  Patient would consider about PICC line

## 2022-09-13 NOTE — PROGRESS NOTE ADULT - PROBLEM SELECTOR PLAN 3
MSSA bacteremia tx at [facility]   TTE negative, no clear source at that time, now currently tx for paraspinal abscess  -Continue IV daptomycin 700 mg daily   -ID consult in AM   -F/u blood cultures  (9/12): Per ID, daptomycin for 8 weeks s/p OR procedure (9/11 -   Will need PICC vs office dapto with Dr. Krause

## 2022-09-13 NOTE — PROGRESS NOTE ADULT - SUBJECTIVE AND OBJECTIVE BOX
Patient is a 46y old  Male who presents with a chief complaint of Paraspinal abscess on CT (12 Sep 2022 17:16)      SUBJECTIVE / OVERNIGHT EVENTS:    MEDICATIONS  (STANDING):  acetaminophen     Tablet .. 650 milliGRAM(s) Oral every 6 hours  DAPTOmycin IVPB 700 milliGRAM(s) IV Intermittent every 24 hours  multivitamin 1 Tablet(s) Oral daily  pantoprazole    Tablet 40 milliGRAM(s) Oral before breakfast  polyethylene glycol 3350 17 Gram(s) Oral daily  senna 2 Tablet(s) Oral at bedtime  sodium chloride 0.9%. 1000 milliLiter(s) (75 mL/Hr) IV Continuous <Continuous>    MEDICATIONS  (PRN):  aluminum hydroxide/magnesium hydroxide/simethicone Suspension 30 milliLiter(s) Oral every 12 hours PRN Indigestion  cyclobenzaprine 5 milliGRAM(s) Oral three times a day PRN Muscle Spasm  HYDROmorphone  Injectable 0.5 milliGRAM(s) SubCutaneous every 6 hours PRN Severe Pain (7 - 10)  magnesium hydroxide Suspension 30 milliLiter(s) Oral every 12 hours PRN Constipation  ondansetron   Disintegrating Tablet 4 milliGRAM(s) Oral every 6 hours PRN Nausea and/or Vomiting  oxyCODONE    IR 5 milliGRAM(s) Oral every 4 hours PRN Mild Pain (1 - 3)  oxyCODONE    IR 10 milliGRAM(s) Oral every 4 hours PRN Moderate Pain (4 - 6)      Vital Signs Last 24 Hrs  T(C): 36.9 (13 Sep 2022 05:10), Max: 37.1 (13 Sep 2022 00:18)  T(F): 98.4 (13 Sep 2022 05:10), Max: 98.8 (13 Sep 2022 00:18)  HR: 97 (13 Sep 2022 05:10) (70 - 98)  BP: 103/69 (13 Sep 2022 05:10) (100/68 - 112/74)  BP(mean): --  RR: 18 (13 Sep 2022 05:10) (18 - 18)  SpO2: 97% (13 Sep 2022 05:10) (95% - 99%)    Parameters below as of 13 Sep 2022 05:10  Patient On (Oxygen Delivery Method): room air      CAPILLARY BLOOD GLUCOSE        I&O's Summary    11 Sep 2022 07:01  -  12 Sep 2022 07:00  --------------------------------------------------------  IN: 350 mL / OUT: 775 mL / NET: -425 mL    12 Sep 2022 07:01  -  13 Sep 2022 05:36  --------------------------------------------------------  IN: 360 mL / OUT: 400 mL / NET: -40 mL        PHYSICAL EXAM:  GENERAL: NAD, well-developed  HEAD:  Atraumatic, Normocephalic  EYES: EOMI, PERRLA, conjunctiva and sclera clear  NECK: Supple, No JVD  CHEST/LUNG: Clear to auscultation bilaterally; No wheeze  HEART: Regular rate and rhythm; No murmurs, rubs, or gallops  ABDOMEN: Soft, Nontender, Nondistended; Bowel sounds present  EXTREMITIES:  2+ Peripheral Pulses, No clubbing, cyanosis, or edema  PSYCH: AAOx3  NEUROLOGY: non-focal  SKIN: No rashes or lesions    LABS:                        11.7   17.59 )-----------( 388      ( 12 Sep 2022 10:49 )             36.6     09-12    135  |  101  |  14  ----------------------------<  170<H>  4.6   |  24  |  0.71    Ca    9.2      12 Sep 2022 10:49  Phos  2.8     09-12  Mg     2.3     09-12    TPro  7.2  /  Alb  3.9  /  TBili  0.2  /  DBili  x   /  AST  25  /  ALT  17  /  AlkPhos  99  09-12              RADIOLOGY & ADDITIONAL TESTS:    Imaging Personally Reviewed:    Consultant(s) Notes Reviewed:      Care Discussed with Consultants/Other Providers:   Patient is a 46y old  Male who presents with a chief complaint of Paraspinal abscess on CT (12 Sep 2022 17:16)      SUBJECTIVE / OVERNIGHT EVENTS: No acute events overnight. Pt was seen and examined at bedside this morning. Pt endorses improvement in lower back pain (2 out of 10 at rest). Pain worses with movement (7 out of 10) but denies nausea, vomiting, headaches, numbness or tingling, changes in BM or dysuria. Discussed prolong antibiotics course and pt considering PICC line vs in office daptomycin tx daily.     MEDICATIONS  (STANDING):  acetaminophen     Tablet .. 650 milliGRAM(s) Oral every 6 hours  DAPTOmycin IVPB 700 milliGRAM(s) IV Intermittent every 24 hours  multivitamin 1 Tablet(s) Oral daily  pantoprazole    Tablet 40 milliGRAM(s) Oral before breakfast  polyethylene glycol 3350 17 Gram(s) Oral daily  senna 2 Tablet(s) Oral at bedtime  sodium chloride 0.9%. 1000 milliLiter(s) (75 mL/Hr) IV Continuous <Continuous>    MEDICATIONS  (PRN):  aluminum hydroxide/magnesium hydroxide/simethicone Suspension 30 milliLiter(s) Oral every 12 hours PRN Indigestion  cyclobenzaprine 5 milliGRAM(s) Oral three times a day PRN Muscle Spasm  HYDROmorphone  Injectable 0.5 milliGRAM(s) SubCutaneous every 6 hours PRN Severe Pain (7 - 10)  magnesium hydroxide Suspension 30 milliLiter(s) Oral every 12 hours PRN Constipation  ondansetron   Disintegrating Tablet 4 milliGRAM(s) Oral every 6 hours PRN Nausea and/or Vomiting  oxyCODONE    IR 5 milliGRAM(s) Oral every 4 hours PRN Mild Pain (1 - 3)  oxyCODONE    IR 10 milliGRAM(s) Oral every 4 hours PRN Moderate Pain (4 - 6)      Vital Signs Last 24 Hrs  T(C): 36.9 (13 Sep 2022 05:10), Max: 37.1 (13 Sep 2022 00:18)  T(F): 98.4 (13 Sep 2022 05:10), Max: 98.8 (13 Sep 2022 00:18)  HR: 97 (13 Sep 2022 05:10) (70 - 98)  BP: 103/69 (13 Sep 2022 05:10) (100/68 - 112/74)  BP(mean): --  RR: 18 (13 Sep 2022 05:10) (18 - 18)  SpO2: 97% (13 Sep 2022 05:10) (95% - 99%)    Parameters below as of 13 Sep 2022 05:10  Patient On (Oxygen Delivery Method): room air      CAPILLARY BLOOD GLUCOSE        I&O's Summary    11 Sep 2022 07:01  -  12 Sep 2022 07:00  --------------------------------------------------------  IN: 350 mL / OUT: 775 mL / NET: -425 mL    12 Sep 2022 07:01  -  13 Sep 2022 05:36  --------------------------------------------------------  IN: 360 mL / OUT: 400 mL / NET: -40 mL        PHYSICAL EXAM:  GENERAL: NAD, well-developed  HEAD:  Atraumatic, Normocephalic  EYES:  pink conjunctiva and anicteric sclera   NECK: Supple, No JVD  CHEST/LUNG: Clear to auscultation bilaterally; No wheeze  HEART: Regular rate and rhythm; No murmurs, rubs, or gallops  ABDOMEN: Soft, Nontender, Nondistended; Bowel sounds present  EXTREMITIES:  2+ Peripheral Pulses, No clubbing, cyanosis, or edema  PSYCH: AAOx3    LABS:                        11.7   17.59 )-----------( 388      ( 12 Sep 2022 10:49 )             36.6     09-12    135  |  101  |  14  ----------------------------<  170<H>  4.6   |  24  |  0.71    Ca    9.2      12 Sep 2022 10:49  Phos  2.8     09-12  Mg     2.3     09-12    TPro  7.2  /  Alb  3.9  /  TBili  0.2  /  DBili  x   /  AST  25  /  ALT  17  /  AlkPhos  99  09-12

## 2022-09-13 NOTE — PROGRESS NOTE ADULT - SUBJECTIVE AND OBJECTIVE BOX
Patient seen and evaluated this AM.  Pain well controlled.  Denies any numbness/tingling in his LEs.  Denies any feelings of weakness in his LEs.  Denies any bowel/bladder concerns. Has been OOB, walking w/ walker.     ICU Vital Signs Last 24 Hrs  T(C): 36.9 (13 Sep 2022 05:10), Max: 37.1 (13 Sep 2022 00:18)  T(F): 98.4 (13 Sep 2022 05:10), Max: 98.8 (13 Sep 2022 00:18)  HR: 97 (13 Sep 2022 05:10) (70 - 98)  BP: 103/69 (13 Sep 2022 05:10) (100/68 - 112/74)  BP(mean): --  ABP: --  ABP(mean): --  RR: 18 (13 Sep 2022 05:10) (18 - 18)  SpO2: 97% (13 Sep 2022 05:10) (95% - 99%)    Spine PE:  Dressing c/d/i  Drain in place   No saddle anesthesia    Motor:               L2             L3             L4               L5            S1  R         5/5           5/5          5/5             5/5           5/5  L          5/5          5/5           5/5             5/5           5/5    Sensory:            L2          L3         L4      L5       S1         (0=absent, 1=impaired, 2=normal, NT=not testable)  R         2            2            2        2        2  L          2            2           2        2         2        A/P:  46yMale s/p T6-11 PSF, Lami T8-T10, progressing well.    -FU labs  -WBAT  -HMV output  -Mitchell out in AM - f/u TOV  -Pain control, multimodal  -PT/OT  -Abx treatment per ID  -FU OR cx  -DVT PE ppx- hold in setting of spine surgery and risk of epidural hematoma; SCD okay   -Incentive spirometry  -Care per primary team     Patient seen and evaluated this AM.  Pain well controlled.  Denies any numbness/tingling in his LEs.  Denies any feelings of weakness in his LEs.  Denies any bowel/bladder concerns. Has been OOB, walking w/ walker.     ICU Vital Signs Last 24 Hrs  T(C): 36.9 (13 Sep 2022 05:10), Max: 37.1 (13 Sep 2022 00:18)  T(F): 98.4 (13 Sep 2022 05:10), Max: 98.8 (13 Sep 2022 00:18)  HR: 97 (13 Sep 2022 05:10) (70 - 98)  BP: 103/69 (13 Sep 2022 05:10) (100/68 - 112/74)  BP(mean): --  ABP: --  ABP(mean): --  RR: 18 (13 Sep 2022 05:10) (18 - 18)  SpO2: 97% (13 Sep 2022 05:10) (95% - 99%)    Spine PE:  Dressing c/d/i  Drain in place   No saddle anesthesia    Motor:               L2             L3             L4               L5            S1  R         5/5           5/5          5/5             5/5           5/5  L          5/5          5/5           5/5             5/5           5/5    Sensory:            L2          L3         L4      L5       S1         (0=absent, 1=impaired, 2=normal, NT=not testable)  R         2            2            2        2        2  L          2            2           2        2         2        A/P:  46yMale s/p T6-11 PSF, Lami T8-T10, progressing well.    -FU labs  -WBAT  -HMV output  -Mitchell out in AM - f/u TOV  -Pain control, multimodal  -PT/OT  -Abx treatment per ID  -FU OR cx  -DVT PE ppx- okay to resume today  -Incentive spirometry  -Care per primary team

## 2022-09-13 NOTE — PROGRESS NOTE ADULT - SUBJECTIVE AND OBJECTIVE BOX
Follow Up:  epidural abscess    Interval History: pt afebrile, doing well, no acute events    ROS:    All other systems negative    Constitutional: no fever, no chills  Cardiovascular:  no chest pain, no palpitation  Respiratory:  no SOB, no cough  GI:  no abd pain, no vomiting, no diarrhea  urinary: no dysuria, no hematuria, no flank pain  musculoskeletal:  controlled back pain  skin:  no rash  neurology:  no headache, no seizure      Allergies  No Known Allergies        ANTIMICROBIALS:  DAPTOmycin IVPB 700 every 24 hours      OTHER MEDS:  acetaminophen     Tablet .. 650 milliGRAM(s) Oral every 6 hours  aluminum hydroxide/magnesium hydroxide/simethicone Suspension 30 milliLiter(s) Oral every 12 hours PRN  cyclobenzaprine 5 milliGRAM(s) Oral three times a day PRN  enoxaparin Injectable 40 milliGRAM(s) SubCutaneous every 24 hours  HYDROmorphone  Injectable 0.5 milliGRAM(s) SubCutaneous every 6 hours PRN  magnesium hydroxide Suspension 30 milliLiter(s) Oral every 12 hours PRN  multivitamin 1 Tablet(s) Oral daily  ondansetron   Disintegrating Tablet 4 milliGRAM(s) Oral every 6 hours PRN  oxyCODONE    IR 5 milliGRAM(s) Oral every 4 hours PRN  oxyCODONE    IR 10 milliGRAM(s) Oral every 4 hours PRN  pantoprazole    Tablet 40 milliGRAM(s) Oral before breakfast  polyethylene glycol 3350 17 Gram(s) Oral daily  senna 2 Tablet(s) Oral at bedtime  sodium chloride 0.9%. 1000 milliLiter(s) IV Continuous <Continuous>      Vital Signs Last 24 Hrs  T(C): 37.3 (13 Sep 2022 12:05), Max: 37.3 (13 Sep 2022 12:05)  T(F): 99.1 (13 Sep 2022 12:05), Max: 99.1 (13 Sep 2022 12:05)  HR: 102 (13 Sep 2022 12:05) (93 - 102)  BP: 100/65 (13 Sep 2022 12:05) (100/65 - 112/74)  BP(mean): --  RR: 18 (13 Sep 2022 12:05) (18 - 18)  SpO2: 96% (13 Sep 2022 12:05) (95% - 97%)    Parameters below as of 13 Sep 2022 12:05  Patient On (Oxygen Delivery Method): room air        Physical Exam:  General:    NAD,  non toxic, sitting on a chair  Respiratory:    comfortable on RA  abd:     soft,   BS +,   no tenderness  :   no CVAT,  no suprapubic tenderness,   no  silverio  Musculoskeletal:   no joint swelling  vascular: no phlebitis  Skin:    no rash                            11.1   12.31 )-----------( 339      ( 13 Sep 2022 07:24 )             35.0       09-13    136  |  101  |  10  ----------------------------<  103<H>  4.3   |  28  |  0.81    Ca    8.9      13 Sep 2022 07:26  Phos  2.7     09-13  Mg     2.1     09-13    TPro  7.1  /  Alb  3.5  /  TBili  0.3  /  DBili  x   /  AST  28  /  ALT  17  /  AlkPhos  94  09-13          MICROBIOLOGY:  v  .Blood Blood  09-10-22   No growth to date.  --  --      .Blood Blood  09-09-22   No growth to date.  --  --      .Blood Blood  09-09-22   No growth to date.  --  --                RADIOLOGY:  Images independently visualized and reviewed personally, findings as below  < from: MR Thoracic Spine w/wo IV Cont (09.10.22 @ 14:05) >  IMPRESSION:  Findings compatible with discitis/osteomyelitis T8-9, with ventral   epidural  abscess and mild central stenosis same level.  Second small focus of T9-T10 discitis and osteomyelitis, subtle.    Final report: Agree with preliminary report.      < end of copied text >

## 2022-09-13 NOTE — PROGRESS NOTE ADULT - ASSESSMENT
46 m with staph aureus bacteremia 8/2022 at [facility], was discharged with dapto to complete the course 9/17 but then developed mid back pain and CT was done which showed T8-T9 abscess so pt was sent to the ED   afebrile, normal WBC: ESR: 89, CRP: 40    recent  MRSA bacteremia on dapto to complete the course on 9/17 ( no source identified) now with back pain and T8-T9 abscess on outpatient CT, here MRI showed T8-T9 discitis/osteo and ventral epidural abscess, mild central stenosis, also small focus of T9-T 10 osteo  here blood cx negative and MRSA/MSSA PCR showed MSSA  s/p laminectomy 9/11 but no cultures sent    * c/w dapto to 700 qd, would extend for an 8 week course post surgery until 11/6  * pt was getting IV antibiotics at the office so did not have a line , now post surgery transportation is more difficult so will need a PICC line  * pt is under ID specialist care, Dr. Krause so he will follow up with him  * weekly CBC, CMP, CK while on antibiotics        The above assessment and plan was discussed with the primary team    Steff Fitzgerald MD  contact on teams  After 5pm and on weekends call [phone]

## 2022-09-14 DIAGNOSIS — Z29.9 ENCOUNTER FOR PROPHYLACTIC MEASURES, UNSPECIFIED: ICD-10-CM

## 2022-09-14 DIAGNOSIS — E04.1 NONTOXIC SINGLE THYROID NODULE: ICD-10-CM

## 2022-09-14 DIAGNOSIS — G06.2 EXTRADURAL AND SUBDURAL ABSCESS, UNSPECIFIED: ICD-10-CM

## 2022-09-14 LAB
ALBUMIN SERPL ELPH-MCNC: 3.4 G/DL — SIGNIFICANT CHANGE UP (ref 3.3–5)
ALP SERPL-CCNC: 104 U/L — SIGNIFICANT CHANGE UP (ref 40–120)
ALT FLD-CCNC: 22 U/L — SIGNIFICANT CHANGE UP (ref 10–45)
ANION GAP SERPL CALC-SCNC: 10 MMOL/L — SIGNIFICANT CHANGE UP (ref 5–17)
APTT BLD: 26.4 SEC — LOW (ref 27.5–35.5)
AST SERPL-CCNC: 29 U/L — SIGNIFICANT CHANGE UP (ref 10–40)
BILIRUB SERPL-MCNC: 0.4 MG/DL — SIGNIFICANT CHANGE UP (ref 0.2–1.2)
BUN SERPL-MCNC: 10 MG/DL — SIGNIFICANT CHANGE UP (ref 7–23)
CALCIUM SERPL-MCNC: 9.4 MG/DL — SIGNIFICANT CHANGE UP (ref 8.4–10.5)
CHLORIDE SERPL-SCNC: 100 MMOL/L — SIGNIFICANT CHANGE UP (ref 96–108)
CO2 SERPL-SCNC: 27 MMOL/L — SIGNIFICANT CHANGE UP (ref 22–31)
CREAT SERPL-MCNC: 0.8 MG/DL — SIGNIFICANT CHANGE UP (ref 0.5–1.3)
EGFR: 109 ML/MIN/1.73M2 — SIGNIFICANT CHANGE UP
GLUCOSE SERPL-MCNC: 111 MG/DL — HIGH (ref 70–99)
HCT VFR BLD CALC: 35.6 % — LOW (ref 39–50)
HGB BLD-MCNC: 11.4 G/DL — LOW (ref 13–17)
INR BLD: 1.27 RATIO — HIGH (ref 0.88–1.16)
MAGNESIUM SERPL-MCNC: 2.1 MG/DL — SIGNIFICANT CHANGE UP (ref 1.6–2.6)
MCHC RBC-ENTMCNC: 28.6 PG — SIGNIFICANT CHANGE UP (ref 27–34)
MCHC RBC-ENTMCNC: 32 GM/DL — SIGNIFICANT CHANGE UP (ref 32–36)
MCV RBC AUTO: 89.4 FL — SIGNIFICANT CHANGE UP (ref 80–100)
NRBC # BLD: 0 /100 WBCS — SIGNIFICANT CHANGE UP (ref 0–0)
PHOSPHATE SERPL-MCNC: 3.8 MG/DL — SIGNIFICANT CHANGE UP (ref 2.5–4.5)
PLATELET # BLD AUTO: 321 K/UL — SIGNIFICANT CHANGE UP (ref 150–400)
POTASSIUM SERPL-MCNC: 4.1 MMOL/L — SIGNIFICANT CHANGE UP (ref 3.5–5.3)
POTASSIUM SERPL-SCNC: 4.1 MMOL/L — SIGNIFICANT CHANGE UP (ref 3.5–5.3)
PROT SERPL-MCNC: 6.9 G/DL — SIGNIFICANT CHANGE UP (ref 6–8.3)
PROTHROM AB SERPL-ACNC: 14.6 SEC — HIGH (ref 10.5–13.4)
RBC # BLD: 3.98 M/UL — LOW (ref 4.2–5.8)
RBC # FLD: 13 % — SIGNIFICANT CHANGE UP (ref 10.3–14.5)
SODIUM SERPL-SCNC: 137 MMOL/L — SIGNIFICANT CHANGE UP (ref 135–145)
WBC # BLD: 10.68 K/UL — HIGH (ref 3.8–10.5)
WBC # FLD AUTO: 10.68 K/UL — HIGH (ref 3.8–10.5)

## 2022-09-14 PROCEDURE — 99232 SBSQ HOSP IP/OBS MODERATE 35: CPT

## 2022-09-14 PROCEDURE — 99232 SBSQ HOSP IP/OBS MODERATE 35: CPT | Mod: GC

## 2022-09-14 RX ADMIN — OXYCODONE HYDROCHLORIDE 10 MILLIGRAM(S): 5 TABLET ORAL at 12:15

## 2022-09-14 RX ADMIN — CYCLOBENZAPRINE HYDROCHLORIDE 5 MILLIGRAM(S): 10 TABLET, FILM COATED ORAL at 15:15

## 2022-09-14 RX ADMIN — OXYCODONE HYDROCHLORIDE 10 MILLIGRAM(S): 5 TABLET ORAL at 04:11

## 2022-09-14 RX ADMIN — OXYCODONE HYDROCHLORIDE 10 MILLIGRAM(S): 5 TABLET ORAL at 03:41

## 2022-09-14 RX ADMIN — POLYETHYLENE GLYCOL 3350 17 GRAM(S): 17 POWDER, FOR SOLUTION ORAL at 11:25

## 2022-09-14 RX ADMIN — OXYCODONE HYDROCHLORIDE 10 MILLIGRAM(S): 5 TABLET ORAL at 16:22

## 2022-09-14 RX ADMIN — ENOXAPARIN SODIUM 40 MILLIGRAM(S): 100 INJECTION SUBCUTANEOUS at 12:14

## 2022-09-14 RX ADMIN — Medication 1 TABLET(S): at 11:25

## 2022-09-14 RX ADMIN — Medication 650 MILLIGRAM(S): at 22:30

## 2022-09-14 RX ADMIN — SENNA PLUS 2 TABLET(S): 8.6 TABLET ORAL at 21:24

## 2022-09-14 RX ADMIN — OXYCODONE HYDROCHLORIDE 10 MILLIGRAM(S): 5 TABLET ORAL at 22:30

## 2022-09-14 RX ADMIN — Medication 650 MILLIGRAM(S): at 03:55

## 2022-09-14 RX ADMIN — Medication 650 MILLIGRAM(S): at 21:24

## 2022-09-14 RX ADMIN — Medication 650 MILLIGRAM(S): at 16:22

## 2022-09-14 RX ADMIN — Medication 650 MILLIGRAM(S): at 09:59

## 2022-09-14 RX ADMIN — DAPTOMYCIN 128 MILLIGRAM(S): 500 INJECTION, POWDER, LYOPHILIZED, FOR SOLUTION INTRAVENOUS at 09:59

## 2022-09-14 RX ADMIN — PANTOPRAZOLE SODIUM 40 MILLIGRAM(S): 20 TABLET, DELAYED RELEASE ORAL at 05:14

## 2022-09-14 RX ADMIN — Medication 650 MILLIGRAM(S): at 03:41

## 2022-09-14 RX ADMIN — Medication 650 MILLIGRAM(S): at 10:30

## 2022-09-14 RX ADMIN — Medication 650 MILLIGRAM(S): at 17:14

## 2022-09-14 RX ADMIN — OXYCODONE HYDROCHLORIDE 10 MILLIGRAM(S): 5 TABLET ORAL at 21:24

## 2022-09-14 RX ADMIN — OXYCODONE HYDROCHLORIDE 10 MILLIGRAM(S): 5 TABLET ORAL at 17:14

## 2022-09-14 RX ADMIN — OXYCODONE HYDROCHLORIDE 10 MILLIGRAM(S): 5 TABLET ORAL at 11:24

## 2022-09-14 NOTE — PROGRESS NOTE ADULT - PROBLEM SELECTOR PLAN 1
detected on outpatient CT imaging  -ID recs appreciated, will continue IV daptomycin 700 mg daily for now  -Ortho spine recs appreciated-       # MRI spine confirmed paraspinal abscess at T8-9      # OR surgery performed (9/11): T8-10 laminectomy with T6-T11 PSF  -F/u blood cultures  -Tylenol PRN for back pain  -PT eval: outpt PT  (9/12): Per ID, will need IV daptomycine 700 mg for 8 weeks (9/11 -   Case d/w Dr. Krause (outpt ID physician) and Dr. Fitzgerald (inpt) detected on outpatient CT imaging  -Ortho spine recs appreciated-       # MRI spine confirmed paraspinal abscess at T8-9      # OR surgery performed (9/11): T8-10 laminectomy with T6-T11 PSF  -F/u blood cultures  -Tylenol PRN for back pain  -PT eval: outpt PT  (9/12): Per ID, will need IV daptomycin 700 mg for 8 weeks (9/11 -   Case d/w Dr. Austin (outpt ID physician) and Dr. Fitzgerald (inpt) detected on outpatient CT imaging  -Ortho spine recs appreciated-       # MRI spine confirmed paraspinal abscess at T8-9      # OR surgery performed (9/11): T8-10 laminectomy with T6-T11 PSF  -F/u blood cultures are negative  -continue current pain medication   -PT eval: outpt PT  - Per ID, will need IV daptomycin 700 mg for 8 weeks (9/11 -   Case d/w Dr. Austin (outpt ID physician) and Dr. Fitzgerald (inpt)

## 2022-09-14 NOTE — PROGRESS NOTE ADULT - ATTENDING COMMENTS
Patient seen and examined in AM  Labs and vitals are reviewed  pain is better   drain is intact   no fever  WBC wnl  46 with PMHx of MRSA bacteremia was on Daptomycin presenting with epidural abscess s/p laminectomy and fusion(T6-11)  would need extended treatment with Daptomycin   ID and ortho note appreciated  DVT prophylaxis - Lovenox  Patient still considering PICC line Patient seen and examined in AM  Labs and vitals are reviewed  pain is better   drain is intact   no fever  WBC near normal  46 with PMHx of MRSA bacteremia was on Daptomycin presenting with epidural abscess s/p laminectomy and fusion(T6-11)  would need extended treatment with Daptomycin   ID and ortho note appreciated  DVT prophylaxis - Lovenox  Patient still considering PICC line

## 2022-09-14 NOTE — PROGRESS NOTE ADULT - SUBJECTIVE AND OBJECTIVE BOX
Follow Up:  epidural abscess    Interval History: pt afebrile,  no acute events    ROS:    All other systems negative    Constitutional: no fever, no chills  Cardiovascular:  no chest pain, no palpitation  Respiratory:  no SOB, no cough  GI:  no abd pain, no vomiting, no diarrhea  urinary: no dysuria, no hematuria, no flank pain  musculoskeletal:  controlled back pain  skin:  no rash  neurology:  no headache, no seizure      Allergies  No Known Allergies        ANTIMICROBIALS:  DAPTOmycin IVPB 700 every 24 hours      OTHER MEDS:  acetaminophen     Tablet .. 650 milliGRAM(s) Oral every 6 hours  aluminum hydroxide/magnesium hydroxide/simethicone Suspension 30 milliLiter(s) Oral every 12 hours PRN  cyclobenzaprine 5 milliGRAM(s) Oral three times a day PRN  enoxaparin Injectable 40 milliGRAM(s) SubCutaneous every 24 hours  HYDROmorphone  Injectable 0.5 milliGRAM(s) SubCutaneous every 6 hours PRN  magnesium hydroxide Suspension 30 milliLiter(s) Oral every 12 hours PRN  multivitamin 1 Tablet(s) Oral daily  ondansetron   Disintegrating Tablet 4 milliGRAM(s) Oral every 6 hours PRN  oxyCODONE    IR 5 milliGRAM(s) Oral every 4 hours PRN  oxyCODONE    IR 10 milliGRAM(s) Oral every 4 hours PRN  pantoprazole    Tablet 40 milliGRAM(s) Oral before breakfast  polyethylene glycol 3350 17 Gram(s) Oral daily  senna 2 Tablet(s) Oral at bedtime  sodium chloride 0.9%. 1000 milliLiter(s) IV Continuous <Continuous>      Vital Signs Last 24 Hrs  T(C): 36.4 (14 Sep 2022 08:50), Max: 37.5 (14 Sep 2022 00:19)  T(F): 97.6 (14 Sep 2022 08:50), Max: 99.5 (14 Sep 2022 00:19)  HR: 98 (14 Sep 2022 09:05) (93 - 120)  BP: 105/75 (14 Sep 2022 08:50) (100/65 - 118/77)  BP(mean): --  RR: 18 (14 Sep 2022 09:05) (18 - 18)  SpO2: 96% (14 Sep 2022 09:05) (93% - 96%)    Parameters below as of 14 Sep 2022 09:05  Patient On (Oxygen Delivery Method): room air        Physical Exam:  General:    NAD,  non toxic, sitting on a chair  Respiratory:    comfortable on RA  abd:     soft,   BS +,   no tenderness  :   no CVAT,  no suprapubic tenderness,   no  silverio  Musculoskeletal:   no joint swelling  vascular: no phlebitis  Skin:    no rash                          11.4   10.68 )-----------( 321      ( 14 Sep 2022 07:10 )             35.6       09-14    137  |  100  |  10  ----------------------------<  111<H>  4.1   |  27  |  0.80    Ca    9.4      14 Sep 2022 07:10  Phos  3.8     09-14  Mg     2.1     09-14    TPro  6.9  /  Alb  3.4  /  TBili  0.4  /  DBili  x   /  AST  29  /  ALT  22  /  AlkPhos  104  09-14          MICROBIOLOGY:  v  .Blood Blood  09-10-22   No growth to date.  --  --      .Blood Blood  09-09-22   No growth to date.  --  --      .Blood Blood  09-09-22   No growth to date.  --  --                RADIOLOGY:  Images independently visualized and reviewed personally, findings as below  < from: MR Thoracic Spine w/wo IV Cont (09.10.22 @ 14:05) >  IMPRESSION:  Findings compatible with discitis/osteomyelitis T8-9, with ventral   epidural  abscess and mild central stenosis same level.  Second small focus of T9-T10 discitis and osteomyelitis, subtle.    < end of copied text >

## 2022-09-14 NOTE — PROGRESS NOTE ADULT - SUBJECTIVE AND OBJECTIVE BOX
Patient seen and evaluated this AM.  Pain well controlled.  Denies any numbness/tingling in his LEs.  Denies any feelings of weakness in his LEs.  Denies any bowel/bladder concerns. Has been OOB, walking w/ walker.     Vital Signs Last 24 Hrs  T(C): 36.7 (14 Sep 2022 05:33), Max: 37.5 (14 Sep 2022 00:19)  T(F): 98.1 (14 Sep 2022 05:33), Max: 99.5 (14 Sep 2022 00:19)  HR: 93 (14 Sep 2022 05:33) (93 - 111)  BP: 106/72 (14 Sep 2022 05:33) (100/65 - 118/77)  BP(mean): --  RR: 18 (14 Sep 2022 05:33) (18 - 18)  SpO2: 93% (14 Sep 2022 05:33) (93% - 96%)    Parameters below as of 14 Sep 2022 05:33  Patient On (Oxygen Delivery Method): room air        Spine PE:  Dressing c/d/i  Drain in place SS  No saddle anesthesia    Motor:               L2             L3             L4               L5            S1  R         5/5           5/5          5/5             5/5           5/5  L          5/5          5/5           5/5             5/5           5/5    Sensory:            L2          L3         L4      L5       S1         (0=absent, 1=impaired, 2=normal, NT=not testable)  R         2            2            2        2        2  L          2            2           2        2         2        A/P:  46yMale s/p T6-11 PSF, Lami T8-T10, progressing well.    -FU labs  -WBAT  -HMV output  -Pain control, multimodal  -PT/OT  -Abx treatment per ID: Dapto, will need a PICC  -FU OR cx  -DVT PPx: Lovenox  -Incentive spirometry  -Care per primary team

## 2022-09-14 NOTE — PROGRESS NOTE ADULT - ASSESSMENT
46M with PMH: recent syncope and MSSA ?bacteremia, currently receiving IV daptomycin EOD 09/17 presents to the ED with complaints of persistent back and rib tightness since 1 week. Patient was found to have paraspinal abscess on outpatient CT on the ventral margin of T8 and T9.Admitted for management of paraspinal abscess , Ortho and ID started following. s/p MRI-spin confirming abscess. Planed for OR today. 46M with PMH: recent syncope and MRSA bacteremia, currently receiving IV daptomycin EOD 09/17 presents to the ED with complaints of persistent back and rib tightness since 1 week. Patient was found to have paraspinal abscess on outpatient CT on the ventral margin of T8 and T9.Admitted for management of paraspinal abscess , Ortho and ID started following. s/p MRI-spin confirming abscess. Planed for OR today.

## 2022-09-14 NOTE — PROGRESS NOTE ADULT - PROBLEM SELECTOR PLAN 5
F: not indicated  E: replete PRN   N: NPO for OR  DVT ppx: with lovenox, Hold for OR    Full Code    Dispo --> RMF Diet: regular   DVT ppx: Lovenox   Dispo: home w/ outpt PT   Full Code

## 2022-09-14 NOTE — PROGRESS NOTE ADULT - SUBJECTIVE AND OBJECTIVE BOX
Patient is a 46y old  Male who presents with a chief complaint of Paraspinal abscess on CT (14 Sep 2022 06:08)      SUBJECTIVE / OVERNIGHT EVENTS:    MEDICATIONS  (STANDING):  acetaminophen     Tablet .. 650 milliGRAM(s) Oral every 6 hours  DAPTOmycin IVPB 700 milliGRAM(s) IV Intermittent every 24 hours  enoxaparin Injectable 40 milliGRAM(s) SubCutaneous every 24 hours  multivitamin 1 Tablet(s) Oral daily  pantoprazole    Tablet 40 milliGRAM(s) Oral before breakfast  polyethylene glycol 3350 17 Gram(s) Oral daily  senna 2 Tablet(s) Oral at bedtime  sodium chloride 0.9%. 1000 milliLiter(s) (75 mL/Hr) IV Continuous <Continuous>    MEDICATIONS  (PRN):  aluminum hydroxide/magnesium hydroxide/simethicone Suspension 30 milliLiter(s) Oral every 12 hours PRN Indigestion  cyclobenzaprine 5 milliGRAM(s) Oral three times a day PRN Muscle Spasm  HYDROmorphone  Injectable 0.5 milliGRAM(s) SubCutaneous every 6 hours PRN Severe Pain (7 - 10)  magnesium hydroxide Suspension 30 milliLiter(s) Oral every 12 hours PRN Constipation  ondansetron   Disintegrating Tablet 4 milliGRAM(s) Oral every 6 hours PRN Nausea and/or Vomiting  oxyCODONE    IR 5 milliGRAM(s) Oral every 4 hours PRN Mild Pain (1 - 3)  oxyCODONE    IR 10 milliGRAM(s) Oral every 4 hours PRN Moderate Pain (4 - 6)      Vital Signs Last 24 Hrs  T(C): 36.7 (14 Sep 2022 05:33), Max: 37.5 (14 Sep 2022 00:19)  T(F): 98.1 (14 Sep 2022 05:33), Max: 99.5 (14 Sep 2022 00:19)  HR: 93 (14 Sep 2022 05:33) (93 - 111)  BP: 106/72 (14 Sep 2022 05:33) (100/65 - 118/77)  BP(mean): --  RR: 18 (14 Sep 2022 05:33) (18 - 18)  SpO2: 93% (14 Sep 2022 05:33) (93% - 96%)    Parameters below as of 14 Sep 2022 05:33  Patient On (Oxygen Delivery Method): room air      CAPILLARY BLOOD GLUCOSE        I&O's Summary    13 Sep 2022 07:01  -  14 Sep 2022 07:00  --------------------------------------------------------  IN: 1080 mL / OUT: 130 mL / NET: 950 mL        PHYSICAL EXAM:  GENERAL: NAD, well-developed  HEAD:  Atraumatic, Normocephalic  EYES: EOMI, PERRLA, conjunctiva and sclera clear  NECK: Supple, No JVD  CHEST/LUNG: Clear to auscultation bilaterally; No wheeze  HEART: Regular rate and rhythm; No murmurs, rubs, or gallops  ABDOMEN: Soft, Nontender, Nondistended; Bowel sounds present  EXTREMITIES:  2+ Peripheral Pulses, No clubbing, cyanosis, or edema  PSYCH: AAOx3  NEUROLOGY: non-focal  SKIN: No rashes or lesions    LABS:                        11.1   12.31 )-----------( 339      ( 13 Sep 2022 07:24 )             35.0     09-13    136  |  101  |  10  ----------------------------<  103<H>  4.3   |  28  |  0.81    Ca    8.9      13 Sep 2022 07:26  Phos  2.7     09-13  Mg     2.1     09-13    TPro  7.1  /  Alb  3.5  /  TBili  0.3  /  DBili  x   /  AST  28  /  ALT  17  /  AlkPhos  94  09-13    PT/INR - ( 13 Sep 2022 07:25 )   PT: 14.2 sec;   INR: 1.22 ratio         PTT - ( 13 Sep 2022 07:25 )  PTT:25.5 sec          RADIOLOGY & ADDITIONAL TESTS:    Imaging Personally Reviewed:    Consultant(s) Notes Reviewed:      Care Discussed with Consultants/Other Providers:   Patient is a 46y old  Male who presents with a chief complaint of Paraspinal abscess on CT (14 Sep 2022 06:08)      SUBJECTIVE / OVERNIGHT EVENTS: No acute events overnight. Pt was seen and examined at bedside this morning. Pt slept well and appears in good spirits. Endorsing mild lower back discomfort (2 out of 10) that worsens with movement (7 out of 10). Additionally, pt notes mild chest tightness but denies headache, nausea, vomiting, chest pain, SOB, numbness/tingling, changes in BM or dysuria/incontinence.     MEDICATIONS  (STANDING):  acetaminophen     Tablet .. 650 milliGRAM(s) Oral every 6 hours  DAPTOmycin IVPB 700 milliGRAM(s) IV Intermittent every 24 hours  enoxaparin Injectable 40 milliGRAM(s) SubCutaneous every 24 hours  multivitamin 1 Tablet(s) Oral daily  pantoprazole    Tablet 40 milliGRAM(s) Oral before breakfast  polyethylene glycol 3350 17 Gram(s) Oral daily  senna 2 Tablet(s) Oral at bedtime  sodium chloride 0.9%. 1000 milliLiter(s) (75 mL/Hr) IV Continuous <Continuous>    MEDICATIONS  (PRN):  aluminum hydroxide/magnesium hydroxide/simethicone Suspension 30 milliLiter(s) Oral every 12 hours PRN Indigestion  cyclobenzaprine 5 milliGRAM(s) Oral three times a day PRN Muscle Spasm  HYDROmorphone  Injectable 0.5 milliGRAM(s) SubCutaneous every 6 hours PRN Severe Pain (7 - 10)  magnesium hydroxide Suspension 30 milliLiter(s) Oral every 12 hours PRN Constipation  ondansetron   Disintegrating Tablet 4 milliGRAM(s) Oral every 6 hours PRN Nausea and/or Vomiting  oxyCODONE    IR 5 milliGRAM(s) Oral every 4 hours PRN Mild Pain (1 - 3)  oxyCODONE    IR 10 milliGRAM(s) Oral every 4 hours PRN Moderate Pain (4 - 6)      Vital Signs Last 24 Hrs  T(C): 36.7 (14 Sep 2022 05:33), Max: 37.5 (14 Sep 2022 00:19)  T(F): 98.1 (14 Sep 2022 05:33), Max: 99.5 (14 Sep 2022 00:19)  HR: 93 (14 Sep 2022 05:33) (93 - 111)  BP: 106/72 (14 Sep 2022 05:33) (100/65 - 118/77)  BP(mean): --  RR: 18 (14 Sep 2022 05:33) (18 - 18)  SpO2: 93% (14 Sep 2022 05:33) (93% - 96%)    Parameters below as of 14 Sep 2022 05:33  Patient On (Oxygen Delivery Method): room air      CAPILLARY BLOOD GLUCOSE        I&O's Summary    13 Sep 2022 07:01  -  14 Sep 2022 07:00  --------------------------------------------------------  IN: 1080 mL / OUT: 130 mL / NET: 950 mL        PHYSICAL EXAM:  GENERAL: NAD, well-developed  HEAD:  Atraumatic, Normocephalic  EYES:  pink conjunctiva and anicteric sclera   NECK: Supple, No JVD  CHEST/LUNG: Clear to auscultation bilaterally; No wheeze  HEART: Regular rate and rhythm; No murmurs, rubs, or gallops  ABDOMEN: Soft, Nontender, Nondistended; Bowel sounds present  EXTREMITIES:  2+ Peripheral Pulses, No clubbing, cyanosis, or edema  SKIN: drain with 70 cc output   PSYCH: AAOx3      LABS:                        11.1   12.31 )-----------( 339      ( 13 Sep 2022 07:24 )             35.0     09-13    136  |  101  |  10  ----------------------------<  103<H>  4.3   |  28  |  0.81    Ca    8.9      13 Sep 2022 07:26  Phos  2.7     09-13  Mg     2.1     09-13    TPro  7.1  /  Alb  3.5  /  TBili  0.3  /  DBili  x   /  AST  28  /  ALT  17  /  AlkPhos  94  09-13    PT/INR - ( 13 Sep 2022 07:25 )   PT: 14.2 sec;   INR: 1.22 ratio         PTT - ( 13 Sep 2022 07:25 )  PTT:25.5 sec          RADIOLOGY & ADDITIONAL TESTS:    Imaging Personally Reviewed:    Consultant(s) Notes Reviewed:    ID  46 m with staph aureus bacteremia 8/2022 at [facility], was discharged with dapto to complete the course 9/17 but then developed mid back pain and CT was done which showed T8-T9 abscess so pt was sent to the ED   afebrile, normal WBC: ESR: 89, CRP: 40    recent  MRSA bacteremia on dapto to complete the course on 9/17 ( no source identified) now with back pain and T8-T9 abscess on outpatient CT, here MRI showed T8-T9 discitis/osteo and ventral epidural abscess, mild central stenosis, also small focus of T9-T 10 osteo  here blood cx negative and MRSA/MSSA PCR showed MSSA, concerning as I am not sure if pt really had MRSA or MSSA (our cultures negative)  s/p laminectomy 9/11 but no cultures sent  * will have to confirm with pt's ID outside to see if it really was MRSA or MSSA  * c/w dapto to 700 qd, would extend for an 8 week course post surgery until 11/6  * pt was getting IV antibiotics at the office so did not have a line , now post surgery transportation is more difficult so will need a PICC line  * pt is under ID specialist care, Dr. Krause so he will follow up with him  * weekly CBC, CMP, CK while on antibiotics

## 2022-09-14 NOTE — PROGRESS NOTE ADULT - ASSESSMENT
46 m with staph aureus bacteremia 8/2022 at [facility], was discharged with dapto to complete the course 9/17 but then developed mid back pain and CT was done which showed T8-T9 abscess so pt was sent to the ED   afebrile, normal WBC: ESR: 89, CRP: 40    recent  MRSA bacteremia on dapto to complete the course on 9/17 ( no source identified) now with back pain and T8-T9 abscess on outpatient CT, here MRI showed T8-T9 discitis/osteo and ventral epidural abscess, mild central stenosis, also small focus of T9-T 10 osteo  here blood cx negative and MRSA/MSSA PCR showed MSSA, concerning as I am not sure if pt really had MRSA or MSSA (our cultures negative)  s/p laminectomy 9/11 but no cultures sent  * will have to confirm with pt's ID outside to see if it really was MRSA or MSSA  * c/w dapto to 700 qd, would extend for an 8 week course post surgery until 11/6  * pt was getting IV antibiotics at the office so did not have a line , now post surgery transportation is more difficult so will need a PICC line  * pt is under ID specialist care, Dr. Krause so he will follow up with him  * weekly CBC, CMP, CK while on antibiotics        The above assessment and plan was discussed with the primary team    Steff Fitzgerald MD  contact on teams  After 5pm and on weekends call [phone]

## 2022-09-14 NOTE — PROGRESS NOTE ADULT - PROBLEM SELECTOR PLAN 2
- Per Ortho spine request for medical clearance before surgery     # Revised Cardiac Risk Index for Pre-Operative Risk:  0 points, 3.9 %  30-day risk of death, MI, or cardiac arrest     # Pt is a low risk candidate for surgery     # NPO     # Hold DVT MRSA bacteremia tx at [facility]   TTE negative, no clear source at that time, now currently tx for paraspinal abscess  -F/u blood cultures  (9/12): Per ID, daptomycin for 8 weeks s/p OR procedure (9/11 -     Pt considering home infusion and working with case management for set up MRSA bacteremia tx at [facility]   TTE negative, no clear source at that time, now currently tx for epidural abscess  -F/u blood cultures  (9/12): Per ID, daptomycin for 8 weeks s/p OR procedure (9/11 -   Pt considering home infusion and working with case management for set up

## 2022-09-14 NOTE — PROGRESS NOTE ADULT - PROBLEM SELECTOR PLAN 3
MSSA bacteremia tx at [facility]   TTE negative, no clear source at that time, now currently tx for paraspinal abscess  -Continue IV daptomycin 700 mg daily   -ID consult in AM   -F/u blood cultures  (9/12): Per ID, daptomycin for 8 weeks s/p OR procedure (9/11 -   Will need PICC vs office dapto with Dr. Krause Mild, 11.4  -Maintain active type and screen  -Transfuse for Hb <7

## 2022-09-14 NOTE — PROGRESS NOTE ADULT - PROBLEM SELECTOR PROBLEM 1
Paraspinal abscess No - the patient is unable to be screened due to medical condition Epidural abscess

## 2022-09-14 NOTE — PROGRESS NOTE ADULT - PROBLEM SELECTOR PLAN 4
Mild, 11.9  -Maintain active type and screen  -Transfuse for Hb <7 - Per Ortho spine request for medical clearance before surgery     # Revised Cardiac Risk Index for Pre-Operative Risk:  0 points, 3.9 %  30-day risk of death, MI, or cardiac arrest     # Pt is a low risk candidate for surgery     # NPO     # Hold DVT Diet: regular   DVT ppx: Lovenox   Dispo: home w/ outpt PT   Full Code MR spine (9/10):  8 x 11 mm T2 bright nodule of the posterior upper to mid right thyroid  - F/u w/ PCP outpt

## 2022-09-15 LAB
ALBUMIN SERPL ELPH-MCNC: 3.3 G/DL — SIGNIFICANT CHANGE UP (ref 3.3–5)
ALP SERPL-CCNC: 107 U/L — SIGNIFICANT CHANGE UP (ref 40–120)
ALT FLD-CCNC: 34 U/L — SIGNIFICANT CHANGE UP (ref 10–45)
ANION GAP SERPL CALC-SCNC: 7 MMOL/L — SIGNIFICANT CHANGE UP (ref 5–17)
AST SERPL-CCNC: 38 U/L — SIGNIFICANT CHANGE UP (ref 10–40)
BILIRUB SERPL-MCNC: 0.2 MG/DL — SIGNIFICANT CHANGE UP (ref 0.2–1.2)
BUN SERPL-MCNC: 11 MG/DL — SIGNIFICANT CHANGE UP (ref 7–23)
CALCIUM SERPL-MCNC: 9.2 MG/DL — SIGNIFICANT CHANGE UP (ref 8.4–10.5)
CHLORIDE SERPL-SCNC: 101 MMOL/L — SIGNIFICANT CHANGE UP (ref 96–108)
CO2 SERPL-SCNC: 30 MMOL/L — SIGNIFICANT CHANGE UP (ref 22–31)
CREAT SERPL-MCNC: 0.72 MG/DL — SIGNIFICANT CHANGE UP (ref 0.5–1.3)
CULTURE RESULTS: SIGNIFICANT CHANGE UP
EGFR: 113 ML/MIN/1.73M2 — SIGNIFICANT CHANGE UP
GLUCOSE SERPL-MCNC: 106 MG/DL — HIGH (ref 70–99)
HCT VFR BLD CALC: 32.7 % — LOW (ref 39–50)
HGB BLD-MCNC: 10.3 G/DL — LOW (ref 13–17)
MAGNESIUM SERPL-MCNC: 2 MG/DL — SIGNIFICANT CHANGE UP (ref 1.6–2.6)
MCHC RBC-ENTMCNC: 28.2 PG — SIGNIFICANT CHANGE UP (ref 27–34)
MCHC RBC-ENTMCNC: 31.5 GM/DL — LOW (ref 32–36)
MCV RBC AUTO: 89.6 FL — SIGNIFICANT CHANGE UP (ref 80–100)
NRBC # BLD: 0 /100 WBCS — SIGNIFICANT CHANGE UP (ref 0–0)
PHOSPHATE SERPL-MCNC: 3.6 MG/DL — SIGNIFICANT CHANGE UP (ref 2.5–4.5)
PLATELET # BLD AUTO: 309 K/UL — SIGNIFICANT CHANGE UP (ref 150–400)
POTASSIUM SERPL-MCNC: 4.3 MMOL/L — SIGNIFICANT CHANGE UP (ref 3.5–5.3)
POTASSIUM SERPL-SCNC: 4.3 MMOL/L — SIGNIFICANT CHANGE UP (ref 3.5–5.3)
PROT SERPL-MCNC: 6.5 G/DL — SIGNIFICANT CHANGE UP (ref 6–8.3)
RBC # BLD: 3.65 M/UL — LOW (ref 4.2–5.8)
RBC # FLD: 12.8 % — SIGNIFICANT CHANGE UP (ref 10.3–14.5)
SODIUM SERPL-SCNC: 138 MMOL/L — SIGNIFICANT CHANGE UP (ref 135–145)
SPECIMEN SOURCE: SIGNIFICANT CHANGE UP
WBC # BLD: 8.19 K/UL — SIGNIFICANT CHANGE UP (ref 3.8–10.5)
WBC # FLD AUTO: 8.19 K/UL — SIGNIFICANT CHANGE UP (ref 3.8–10.5)

## 2022-09-15 PROCEDURE — 71045 X-RAY EXAM CHEST 1 VIEW: CPT | Mod: 26

## 2022-09-15 PROCEDURE — 99233 SBSQ HOSP IP/OBS HIGH 50: CPT | Mod: GC

## 2022-09-15 RX ADMIN — ENOXAPARIN SODIUM 40 MILLIGRAM(S): 100 INJECTION SUBCUTANEOUS at 12:30

## 2022-09-15 RX ADMIN — Medication 650 MILLIGRAM(S): at 05:00

## 2022-09-15 RX ADMIN — OXYCODONE HYDROCHLORIDE 10 MILLIGRAM(S): 5 TABLET ORAL at 07:00

## 2022-09-15 RX ADMIN — PANTOPRAZOLE SODIUM 40 MILLIGRAM(S): 20 TABLET, DELAYED RELEASE ORAL at 06:20

## 2022-09-15 RX ADMIN — Medication 650 MILLIGRAM(S): at 10:58

## 2022-09-15 RX ADMIN — OXYCODONE HYDROCHLORIDE 10 MILLIGRAM(S): 5 TABLET ORAL at 02:30

## 2022-09-15 RX ADMIN — POLYETHYLENE GLYCOL 3350 17 GRAM(S): 17 POWDER, FOR SOLUTION ORAL at 12:31

## 2022-09-15 RX ADMIN — OXYCODONE HYDROCHLORIDE 10 MILLIGRAM(S): 5 TABLET ORAL at 16:40

## 2022-09-15 RX ADMIN — CYCLOBENZAPRINE HYDROCHLORIDE 5 MILLIGRAM(S): 10 TABLET, FILM COATED ORAL at 12:30

## 2022-09-15 RX ADMIN — Medication 650 MILLIGRAM(S): at 17:58

## 2022-09-15 RX ADMIN — CYCLOBENZAPRINE HYDROCHLORIDE 5 MILLIGRAM(S): 10 TABLET, FILM COATED ORAL at 04:07

## 2022-09-15 RX ADMIN — CYCLOBENZAPRINE HYDROCHLORIDE 5 MILLIGRAM(S): 10 TABLET, FILM COATED ORAL at 23:06

## 2022-09-15 RX ADMIN — OXYCODONE HYDROCHLORIDE 10 MILLIGRAM(S): 5 TABLET ORAL at 01:29

## 2022-09-15 RX ADMIN — SENNA PLUS 2 TABLET(S): 8.6 TABLET ORAL at 23:06

## 2022-09-15 RX ADMIN — OXYCODONE HYDROCHLORIDE 10 MILLIGRAM(S): 5 TABLET ORAL at 15:46

## 2022-09-15 RX ADMIN — OXYCODONE HYDROCHLORIDE 10 MILLIGRAM(S): 5 TABLET ORAL at 20:35

## 2022-09-15 RX ADMIN — Medication 650 MILLIGRAM(S): at 23:06

## 2022-09-15 RX ADMIN — OXYCODONE HYDROCHLORIDE 10 MILLIGRAM(S): 5 TABLET ORAL at 21:35

## 2022-09-15 RX ADMIN — MAGNESIUM HYDROXIDE 30 MILLILITER(S): 400 TABLET, CHEWABLE ORAL at 12:33

## 2022-09-15 RX ADMIN — Medication 650 MILLIGRAM(S): at 11:45

## 2022-09-15 RX ADMIN — OXYCODONE HYDROCHLORIDE 10 MILLIGRAM(S): 5 TABLET ORAL at 06:19

## 2022-09-15 RX ADMIN — Medication 650 MILLIGRAM(S): at 04:07

## 2022-09-15 RX ADMIN — DAPTOMYCIN 128 MILLIGRAM(S): 500 INJECTION, POWDER, LYOPHILIZED, FOR SOLUTION INTRAVENOUS at 10:58

## 2022-09-15 RX ADMIN — Medication 1 TABLET(S): at 12:30

## 2022-09-15 NOTE — PROGRESS NOTE ADULT - SUBJECTIVE AND OBJECTIVE BOX
Patient seen and evaluated this AM.  Pain well controlled.  Denies any numbness/tingling in his LEs.  Denies any feelings of weakness in his LEs.  Denies any bowel/bladder concerns. Has been OOB, walking w/ walker.     ICU Vital Signs Last 24 Hrs  T(C): 36.7 (15 Sep 2022 06:00), Max: 37.1 (15 Sep 2022 00:07)  T(F): 98.1 (15 Sep 2022 06:00), Max: 98.8 (15 Sep 2022 00:07)  HR: 93 (15 Sep 2022 06:00) (89 - 120)  BP: 107/74 (15 Sep 2022 06:00) (105/75 - 110/74)  BP(mean): --  ABP: --  ABP(mean): --  RR: 18 (15 Sep 2022 06:00) (18 - 18)  SpO2: 95% (15 Sep 2022 06:00) (93% - 99%)        Spine PE:  Dressing c/d/i  Drain in place SS  No saddle anesthesia    Motor:               L2             L3             L4               L5            S1  R         5/5           5/5          5/5             5/5           5/5  L          5/5          5/5           5/5             5/5           5/5    Sensory:            L2          L3         L4      L5       S1         (0=absent, 1=impaired, 2=normal, NT=not testable)  R         2            2            2        2        2  L          2            2           2        2         2        A/P:  46yMale s/p T6-11 PSF, Lami T8-T10, progressing well.    -FU labs  -WBAT  -HMV output  -Pain control, multimodal  -PT/OT  -Abx treatment per ID: Dapto, will need a PICC  -FU OR cx  -DVT PPx: Lovenox  -Incentive spirometry  -Care per primary team

## 2022-09-15 NOTE — PROGRESS NOTE ADULT - PROBLEM SELECTOR PLAN 2
MRSA bacteremia tx at [facility]   TTE negative, no clear source at that time, now currently tx for epidural abscess  -F/u blood cultures  (9/12): Per ID, daptomycin for 8 weeks s/p OR procedure (9/11 -   Pt considering home infusion and working with case management for set up MRSA bacteremia tx at [facility]   TTE negative, no clear source at that time, now currently tx for epidural abscess  -F/u blood cultures  (9/12): Per ID, daptomycin for 8 weeks s/p OR procedure (9/11 -   Awaiting PICC placement for antibiotic course

## 2022-09-15 NOTE — PROGRESS NOTE ADULT - PROBLEM SELECTOR PLAN 3
Mild, 11.4  -Maintain active type and screen  -Transfuse for Hb <7 Hgb: 10.3, will trend   -Maintain active type and screen  -Transfuse for Hb <7

## 2022-09-15 NOTE — PROGRESS NOTE ADULT - SUBJECTIVE AND OBJECTIVE BOX
Patient is a 46y old  Male who presents with a chief complaint of Paraspinal abscess on CT (14 Sep 2022 11:14)      SUBJECTIVE / OVERNIGHT EVENTS:    MEDICATIONS  (STANDING):  acetaminophen     Tablet .. 650 milliGRAM(s) Oral every 6 hours  DAPTOmycin IVPB 700 milliGRAM(s) IV Intermittent every 24 hours  enoxaparin Injectable 40 milliGRAM(s) SubCutaneous every 24 hours  multivitamin 1 Tablet(s) Oral daily  pantoprazole    Tablet 40 milliGRAM(s) Oral before breakfast  polyethylene glycol 3350 17 Gram(s) Oral daily  senna 2 Tablet(s) Oral at bedtime  sodium chloride 0.9%. 1000 milliLiter(s) (75 mL/Hr) IV Continuous <Continuous>    MEDICATIONS  (PRN):  aluminum hydroxide/magnesium hydroxide/simethicone Suspension 30 milliLiter(s) Oral every 12 hours PRN Indigestion  cyclobenzaprine 5 milliGRAM(s) Oral three times a day PRN Muscle Spasm  HYDROmorphone  Injectable 0.5 milliGRAM(s) SubCutaneous every 6 hours PRN Severe Pain (7 - 10)  magnesium hydroxide Suspension 30 milliLiter(s) Oral every 12 hours PRN Constipation  ondansetron   Disintegrating Tablet 4 milliGRAM(s) Oral every 6 hours PRN Nausea and/or Vomiting  oxyCODONE    IR 10 milliGRAM(s) Oral every 4 hours PRN Moderate Pain (4 - 6)  oxyCODONE    IR 5 milliGRAM(s) Oral every 4 hours PRN Mild Pain (1 - 3)      Vital Signs Last 24 Hrs  T(C): 37.1 (15 Sep 2022 00:07), Max: 37.1 (15 Sep 2022 00:07)  T(F): 98.8 (15 Sep 2022 00:07), Max: 98.8 (15 Sep 2022 00:07)  HR: 100 (15 Sep 2022 00:07) (89 - 120)  BP: 110/72 (15 Sep 2022 00:07) (105/75 - 110/74)  BP(mean): --  RR: 18 (15 Sep 2022 00:07) (18 - 18)  SpO2: 93% (15 Sep 2022 00:07) (93% - 99%)    Parameters below as of 15 Sep 2022 00:07  Patient On (Oxygen Delivery Method): room air      CAPILLARY BLOOD GLUCOSE        I&O's Summary    13 Sep 2022 07:01  -  14 Sep 2022 07:00  --------------------------------------------------------  IN: 1080 mL / OUT: 130 mL / NET: 950 mL    14 Sep 2022 07:01  -  15 Sep 2022 06:11  --------------------------------------------------------  IN: 970 mL / OUT: 60 mL / NET: 910 mL        PHYSICAL EXAM:  GENERAL: NAD, well-developed  HEAD:  Atraumatic, Normocephalic  EYES: EOMI, PERRLA, conjunctiva and sclera clear  NECK: Supple, No JVD  CHEST/LUNG: Clear to auscultation bilaterally; No wheeze  HEART: Regular rate and rhythm; No murmurs, rubs, or gallops  ABDOMEN: Soft, Nontender, Nondistended; Bowel sounds present  EXTREMITIES:  2+ Peripheral Pulses, No clubbing, cyanosis, or edema  PSYCH: AAOx3  NEUROLOGY: non-focal  SKIN: No rashes or lesions    LABS:                        11.4   10.68 )-----------( 321      ( 14 Sep 2022 07:10 )             35.6     09-14    137  |  100  |  10  ----------------------------<  111<H>  4.1   |  27  |  0.80    Ca    9.4      14 Sep 2022 07:10  Phos  3.8     09-14  Mg     2.1     09-14    TPro  6.9  /  Alb  3.4  /  TBili  0.4  /  DBili  x   /  AST  29  /  ALT  22  /  AlkPhos  104  09-14    PT/INR - ( 14 Sep 2022 07:10 )   PT: 14.6 sec;   INR: 1.27 ratio         PTT - ( 14 Sep 2022 07:10 )  PTT:26.4 sec          RADIOLOGY & ADDITIONAL TESTS:    Imaging Personally Reviewed:    Consultant(s) Notes Reviewed:      Care Discussed with Consultants/Other Providers:   Patient is a 46y old  Male who presents with a chief complaint of Paraspinal abscess on CT (14 Sep 2022 11:14)      SUBJECTIVE / OVERNIGHT EVENTS: No acute events overnight. Pt was seen and examined at bedside this morning. Pt reports doing well and is in good spirits. Denies headache, nausea, vomiting, changes in BM, dysuria, CP, palpitations or changes in vision. Pt amenable to PICC line for prolonged antibiotic course. Case discussed with Dr. Austin (ID physician at [facility] [place]), pt was started on daptomycin for MSSA bacteremia and pt's desires for once a day antibiotic tx. Per Dr. Fitzgerald, will consider ancef now that PICC line will placed.      MEDICATIONS  (STANDING):  acetaminophen     Tablet .. 650 milliGRAM(s) Oral every 6 hours  DAPTOmycin IVPB 700 milliGRAM(s) IV Intermittent every 24 hours  enoxaparin Injectable 40 milliGRAM(s) SubCutaneous every 24 hours  multivitamin 1 Tablet(s) Oral daily  pantoprazole    Tablet 40 milliGRAM(s) Oral before breakfast  polyethylene glycol 3350 17 Gram(s) Oral daily  senna 2 Tablet(s) Oral at bedtime  sodium chloride 0.9%. 1000 milliLiter(s) (75 mL/Hr) IV Continuous <Continuous>    MEDICATIONS  (PRN):  aluminum hydroxide/magnesium hydroxide/simethicone Suspension 30 milliLiter(s) Oral every 12 hours PRN Indigestion  cyclobenzaprine 5 milliGRAM(s) Oral three times a day PRN Muscle Spasm  HYDROmorphone  Injectable 0.5 milliGRAM(s) SubCutaneous every 6 hours PRN Severe Pain (7 - 10)  magnesium hydroxide Suspension 30 milliLiter(s) Oral every 12 hours PRN Constipation  ondansetron   Disintegrating Tablet 4 milliGRAM(s) Oral every 6 hours PRN Nausea and/or Vomiting  oxyCODONE    IR 10 milliGRAM(s) Oral every 4 hours PRN Moderate Pain (4 - 6)  oxyCODONE    IR 5 milliGRAM(s) Oral every 4 hours PRN Mild Pain (1 - 3)      Vital Signs Last 24 Hrs  T(C): 37.1 (15 Sep 2022 00:07), Max: 37.1 (15 Sep 2022 00:07)  T(F): 98.8 (15 Sep 2022 00:07), Max: 98.8 (15 Sep 2022 00:07)  HR: 100 (15 Sep 2022 00:07) (89 - 120)  BP: 110/72 (15 Sep 2022 00:07) (105/75 - 110/74)  BP(mean): --  RR: 18 (15 Sep 2022 00:07) (18 - 18)  SpO2: 93% (15 Sep 2022 00:07) (93% - 99%)    Parameters below as of 15 Sep 2022 00:07  Patient On (Oxygen Delivery Method): room air      CAPILLARY BLOOD GLUCOSE        I&O's Summary    13 Sep 2022 07:01  -  14 Sep 2022 07:00  --------------------------------------------------------  IN: 1080 mL / OUT: 130 mL / NET: 950 mL    14 Sep 2022 07:01  -  15 Sep 2022 06:11  --------------------------------------------------------  IN: 970 mL / OUT: 60 mL / NET: 910 mL        PHYSICAL EXAM:  GENERAL: NAD, well-developed  HEAD:  Atraumatic, Normocephalic  EYES:  pink conjunctiva and anicteric sclera   NECK: Supple, No JVD  CHEST/LUNG: Clear to auscultation bilaterally; No wheeze  HEART: Regular rate and rhythm; No murmurs, rubs, or gallops  ABDOMEN: Soft, Nontender, Nondistended; Bowel sounds present  EXTREMITIES:  2+ Peripheral Pulses, No clubbing, cyanosis, or edema  SKIN: DAVID drain in place   PSYCH: AAOx3    LABS:                        11.4   10.68 )-----------( 321      ( 14 Sep 2022 07:10 )             35.6     09-14    137  |  100  |  10  ----------------------------<  111<H>  4.1   |  27  |  0.80    Ca    9.4      14 Sep 2022 07:10  Phos  3.8     09-14  Mg     2.1     09-14    TPro  6.9  /  Alb  3.4  /  TBili  0.4  /  DBili  x   /  AST  29  /  ALT  22  /  AlkPhos  104  09-14    PT/INR - ( 14 Sep 2022 07:10 )   PT: 14.6 sec;   INR: 1.27 ratio         PTT - ( 14 Sep 2022 07:10 )  PTT:26.4 sec          RADIOLOGY & ADDITIONAL TESTS:    Imaging Personally Reviewed:    Consultant(s) Notes Reviewed:    Ortho:   46yMale s/p T6-11 PSF, Lami T8-T10, progressing well.    -FU labs  -WBAT  -HMV output  -Pain control, multimodal  -PT/OT  -Abx treatment per ID: Dapto, will need a PICC  -FU OR cx  -DVT PPx: Lovenox  -Incentive spirometry  -Care per primary team

## 2022-09-15 NOTE — PROGRESS NOTE ADULT - PROBLEM SELECTOR PLAN 1
detected on outpatient CT imaging  -Ortho spine recs appreciated-       # MRI spine confirmed paraspinal abscess at T8-9      # OR surgery performed (9/11): T8-10 laminectomy with T6-T11 PSF  -F/u blood cultures are negative  -continue current pain medication   -PT eval: outpt PT  - Per ID, will need IV daptomycin 700 mg for 8 weeks (9/11 -   Case d/w Dr. Austin (outpt ID physician) and Dr. Fitzgerald (inpt) detected on outpatient CT imaging  -Ortho spine recs appreciated-       # MRI spine confirmed paraspinal abscess at T8-9      # OR surgery performed (9/11): T8-10 laminectomy with T6-T11 PSF  -F/u blood cultures are negative  -continue current pain medication   -PT eval: outpt PT  - Per ID, will need IV daptomycin 700 mg for 8 weeks (9/11 -   Case d/w Dr. Austin (outpt ID physician) and Dr. Fitzgerald (inpt). Per Dr. Fitzgerald, will consider Ancef now that PICC line may be placed.

## 2022-09-15 NOTE — PROGRESS NOTE ADULT - PROBLEM SELECTOR PLAN 4
MR spine (9/10):  8 x 11 mm T2 bright nodule of the posterior upper to mid right thyroid  - F/u w/ PCP outpt

## 2022-09-15 NOTE — PROGRESS NOTE ADULT - ASSESSMENT
46M with PMH: recent syncope and MRSA bacteremia, currently receiving IV daptomycin EOD 09/17 presents to the ED with complaints of persistent back and rib tightness since 1 week. Patient was found to have paraspinal abscess on outpatient CT on the ventral margin of T8 and T9.Admitted for management of paraspinal abscess , Ortho and ID started following. s/p MRI-spin confirming abscess. Planed for OR today.

## 2022-09-15 NOTE — PROGRESS NOTE ADULT - ATTENDING COMMENTS
Agree with above, mobilizing well. Continue PT/OT. ID following for abx Agree with above, making progress with PT. Monitor drain output

## 2022-09-15 NOTE — PROGRESS NOTE ADULT - ATTENDING COMMENTS
Patient seen and examined   Labs and vitals are reviewed  progressing okay  drain is intact   no fever  no chills   pain at the surgical site is better   WBC near normal  A/P- 46 with PMHx of MSSA  bacteremia ( Not MRSA reportedly as per his out patient ID Dr Ross)   was on Daptomycin presenting with epidural abscess s/p laminectomy and fusion(T6-11)  would need extended treatment with IV antibiotic   Ortho note appreciated I attempted to reach out to Norman to confirm that he had MSSA bacteremia and was Daptomycin for once a day infusion however, I will discuss with Dr Fitzgerald and Dr Austin - for now continue Daptomycin   Tissue biopsy is pending   DVT prophylaxis - Lovenox  Patient agreed for PICC line- IR consult Patient seen and examined   Labs and vitals are reviewed  progressing okay  drain is intact   no fever  no chills   pain at the surgical site is better   WBC near normal  A/P- 46 with PMHx of MSSA  bacteremia ( Not MRSA reportedly as per his out patient ID Dr Ross)   was on Daptomycin presenting with epidural abscess s/p laminectomy and fusion(T6-11)  would need extended treatment with IV antibiotic   Ortho note appreciated I attempted to reach out to Norman to confirm that he had MSSA bacteremia and was Daptomycin for once a day infusion however, I will discuss with Dr Fitzgerald and Dr Austin - for now continue Daptomycin   Tissue biopsy is pending   DVT prophylaxis - Lovenox  Patient agreed for PICC line- IR consult  add on  discussed  with Dr. Austin   patient had h/o of high grade MSSA bacteremia which was cleared   He recommended either Dapto or Nafcillin pump for better CNS penetration   will d/w Dr Fitzgerald

## 2022-09-16 LAB
ANION GAP SERPL CALC-SCNC: 9 MMOL/L — SIGNIFICANT CHANGE UP (ref 5–17)
BUN SERPL-MCNC: 12 MG/DL — SIGNIFICANT CHANGE UP (ref 7–23)
CALCIUM SERPL-MCNC: 9 MG/DL — SIGNIFICANT CHANGE UP (ref 8.4–10.5)
CHLORIDE SERPL-SCNC: 101 MMOL/L — SIGNIFICANT CHANGE UP (ref 96–108)
CO2 SERPL-SCNC: 30 MMOL/L — SIGNIFICANT CHANGE UP (ref 22–31)
CREAT SERPL-MCNC: 0.86 MG/DL — SIGNIFICANT CHANGE UP (ref 0.5–1.3)
EGFR: 107 ML/MIN/1.73M2 — SIGNIFICANT CHANGE UP
GLUCOSE SERPL-MCNC: 90 MG/DL — SIGNIFICANT CHANGE UP (ref 70–99)
HCT VFR BLD CALC: 31 % — LOW (ref 39–50)
HGB BLD-MCNC: 9.8 G/DL — LOW (ref 13–17)
MCHC RBC-ENTMCNC: 28.1 PG — SIGNIFICANT CHANGE UP (ref 27–34)
MCHC RBC-ENTMCNC: 31.6 GM/DL — LOW (ref 32–36)
MCV RBC AUTO: 88.8 FL — SIGNIFICANT CHANGE UP (ref 80–100)
NRBC # BLD: 0 /100 WBCS — SIGNIFICANT CHANGE UP (ref 0–0)
PLATELET # BLD AUTO: 353 K/UL — SIGNIFICANT CHANGE UP (ref 150–400)
POTASSIUM SERPL-MCNC: 4.3 MMOL/L — SIGNIFICANT CHANGE UP (ref 3.5–5.3)
POTASSIUM SERPL-SCNC: 4.3 MMOL/L — SIGNIFICANT CHANGE UP (ref 3.5–5.3)
RBC # BLD: 3.49 M/UL — LOW (ref 4.2–5.8)
RBC # FLD: 13 % — SIGNIFICANT CHANGE UP (ref 10.3–14.5)
SARS-COV-2 RNA SPEC QL NAA+PROBE: SIGNIFICANT CHANGE UP
SODIUM SERPL-SCNC: 140 MMOL/L — SIGNIFICANT CHANGE UP (ref 135–145)
SURGICAL PATHOLOGY STUDY: SIGNIFICANT CHANGE UP
WBC # BLD: 7.14 K/UL — SIGNIFICANT CHANGE UP (ref 3.8–10.5)
WBC # FLD AUTO: 7.14 K/UL — SIGNIFICANT CHANGE UP (ref 3.8–10.5)

## 2022-09-16 PROCEDURE — 99232 SBSQ HOSP IP/OBS MODERATE 35: CPT

## 2022-09-16 PROCEDURE — 99233 SBSQ HOSP IP/OBS HIGH 50: CPT | Mod: GC

## 2022-09-16 RX ORDER — NAFCILLIN 10 G/100ML
2 INJECTION, POWDER, FOR SOLUTION INTRAVENOUS
Qty: 672 | Refills: 0
Start: 2022-09-16 | End: 2022-11-10

## 2022-09-16 RX ORDER — SACCHAROMYCES BOULARDII 250 MG
250 POWDER IN PACKET (EA) ORAL
Refills: 0 | Status: DISCONTINUED | OUTPATIENT
Start: 2022-09-16 | End: 2022-09-18

## 2022-09-16 RX ORDER — NAFCILLIN 10 G/100ML
0 INJECTION, POWDER, FOR SOLUTION INTRAVENOUS
Qty: 0 | Refills: 0 | DISCHARGE

## 2022-09-16 RX ORDER — NAFCILLIN 10 G/100ML
2 INJECTION, POWDER, FOR SOLUTION INTRAVENOUS EVERY 4 HOURS
Refills: 0 | Status: DISCONTINUED | OUTPATIENT
Start: 2022-09-16 | End: 2022-09-18

## 2022-09-16 RX ORDER — NAFCILLIN 10 G/100ML
2 INJECTION, POWDER, FOR SOLUTION INTRAVENOUS ONCE
Refills: 0 | Status: COMPLETED | OUTPATIENT
Start: 2022-09-16 | End: 2022-09-16

## 2022-09-16 RX ORDER — NAFCILLIN 10 G/100ML
INJECTION, POWDER, FOR SOLUTION INTRAVENOUS
Refills: 0 | Status: DISCONTINUED | OUTPATIENT
Start: 2022-09-16 | End: 2022-09-18

## 2022-09-16 RX ADMIN — SENNA PLUS 2 TABLET(S): 8.6 TABLET ORAL at 22:31

## 2022-09-16 RX ADMIN — PANTOPRAZOLE SODIUM 40 MILLIGRAM(S): 20 TABLET, DELAYED RELEASE ORAL at 06:22

## 2022-09-16 RX ADMIN — Medication 650 MILLIGRAM(S): at 00:30

## 2022-09-16 RX ADMIN — Medication 650 MILLIGRAM(S): at 23:27

## 2022-09-16 RX ADMIN — ENOXAPARIN SODIUM 40 MILLIGRAM(S): 100 INJECTION SUBCUTANEOUS at 11:27

## 2022-09-16 RX ADMIN — OXYCODONE HYDROCHLORIDE 10 MILLIGRAM(S): 5 TABLET ORAL at 11:31

## 2022-09-16 RX ADMIN — Medication 1 TABLET(S): at 11:27

## 2022-09-16 RX ADMIN — NAFCILLIN 200 GRAM(S): 10 INJECTION, POWDER, FOR SOLUTION INTRAVENOUS at 20:21

## 2022-09-16 RX ADMIN — POLYETHYLENE GLYCOL 3350 17 GRAM(S): 17 POWDER, FOR SOLUTION ORAL at 11:28

## 2022-09-16 RX ADMIN — Medication 650 MILLIGRAM(S): at 06:22

## 2022-09-16 RX ADMIN — DAPTOMYCIN 128 MILLIGRAM(S): 500 INJECTION, POWDER, LYOPHILIZED, FOR SOLUTION INTRAVENOUS at 09:54

## 2022-09-16 RX ADMIN — NAFCILLIN 200 GRAM(S): 10 INJECTION, POWDER, FOR SOLUTION INTRAVENOUS at 15:32

## 2022-09-16 RX ADMIN — OXYCODONE HYDROCHLORIDE 10 MILLIGRAM(S): 5 TABLET ORAL at 18:47

## 2022-09-16 RX ADMIN — Medication 650 MILLIGRAM(S): at 16:36

## 2022-09-16 RX ADMIN — OXYCODONE HYDROCHLORIDE 10 MILLIGRAM(S): 5 TABLET ORAL at 04:00

## 2022-09-16 RX ADMIN — Medication 650 MILLIGRAM(S): at 06:39

## 2022-09-16 RX ADMIN — Medication 650 MILLIGRAM(S): at 10:49

## 2022-09-16 RX ADMIN — Medication 250 MILLIGRAM(S): at 23:27

## 2022-09-16 RX ADMIN — OXYCODONE HYDROCHLORIDE 10 MILLIGRAM(S): 5 TABLET ORAL at 03:03

## 2022-09-16 NOTE — PROGRESS NOTE ADULT - PROBLEM SELECTOR PLAN 2
MRSA bacteremia tx at [facility]   TTE negative, no clear source at that time, now currently tx for epidural abscess  -F/u blood cultures  (9/12): Per ID, daptomycin for 8 weeks s/p OR procedure (9/11 -   Awaiting PICC placement for antibiotic course MRSA bacteremia tx at [facility]   TTE negative, no clear source at that time, now currently tx for epidural abscess  -F/u blood cultures  (9/12): Per ID, daptomycin for 8 weeks s/p OR procedure (9/11 -   PICC placed; pending ID recs for antibiotic course MRSA bacteremia tx at [facility]   TTE negative, no clear source at that time, now currently tx for epidural abscess  -F/u blood cultures  (9/12): Per ID, daptomycin for 8 weeks s/p OR procedure (9/11 -   PICC placed; per ID, dapto --> nafcillin (9/16 - for 8 weeks s/p OR procedure (9/11 - 11/7)

## 2022-09-16 NOTE — PROGRESS NOTE ADULT - SUBJECTIVE AND OBJECTIVE BOX
Patient is a 46y old  Male who presents with a chief complaint of Paraspinal abscess on CT (16 Sep 2022 06:30)      SUBJECTIVE / OVERNIGHT EVENTS: No acute events overnight. Pt was seen and examined at bedside this morning. Pt reports sleeping well and is in good spirits. Endorses improving mid/lower back pain with movement. Denies headache, nausea, vomiting, CP, sob, palpitations, changes in BM or dysuria. Will discuss with orthopedic surgery for timeline on drain removal. Will discuss with Dr. Fitzgerald regarding antibiotic of choice for MSSA bactermia. PICC in place.     MEDICATIONS  (STANDING):  acetaminophen     Tablet .. 650 milliGRAM(s) Oral every 6 hours  DAPTOmycin IVPB 700 milliGRAM(s) IV Intermittent every 24 hours  enoxaparin Injectable 40 milliGRAM(s) SubCutaneous every 24 hours  multivitamin 1 Tablet(s) Oral daily  pantoprazole    Tablet 40 milliGRAM(s) Oral before breakfast  polyethylene glycol 3350 17 Gram(s) Oral daily  senna 2 Tablet(s) Oral at bedtime  sodium chloride 0.9%. 1000 milliLiter(s) (75 mL/Hr) IV Continuous <Continuous>    MEDICATIONS  (PRN):  aluminum hydroxide/magnesium hydroxide/simethicone Suspension 30 milliLiter(s) Oral every 12 hours PRN Indigestion  cyclobenzaprine 5 milliGRAM(s) Oral three times a day PRN Muscle Spasm  HYDROmorphone  Injectable 0.5 milliGRAM(s) SubCutaneous every 6 hours PRN Severe Pain (7 - 10)  magnesium hydroxide Suspension 30 milliLiter(s) Oral every 12 hours PRN Constipation  ondansetron   Disintegrating Tablet 4 milliGRAM(s) Oral every 6 hours PRN Nausea and/or Vomiting  oxyCODONE    IR 5 milliGRAM(s) Oral every 4 hours PRN Mild Pain (1 - 3)  oxyCODONE    IR 10 milliGRAM(s) Oral every 4 hours PRN Moderate Pain (4 - 6)      Vital Signs Last 24 Hrs  T(C): 36.7 (16 Sep 2022 09:02), Max: 36.9 (15 Sep 2022 23:32)  T(F): 98 (16 Sep 2022 09:02), Max: 98.4 (15 Sep 2022 23:32)  HR: 85 (16 Sep 2022 09:02) (83 - 98)  BP: 106/66 (16 Sep 2022 09:02) (101/66 - 130/88)  BP(mean): --  RR: 18 (16 Sep 2022 09:02) (18 - 18)  SpO2: 96% (16 Sep 2022 09:02) (96% - 98%)    Parameters below as of 16 Sep 2022 09:02  Patient On (Oxygen Delivery Method): room air      CAPILLARY BLOOD GLUCOSE        I&O's Summary    15 Sep 2022 07:01  -  16 Sep 2022 07:00  --------------------------------------------------------  IN: 880 mL / OUT: 32 mL / NET: 848 mL    16 Sep 2022 07:01  -  16 Sep 2022 11:37  --------------------------------------------------------  IN: 480 mL / OUT: 0 mL / NET: 480 mL        PHYSICAL EXAM:  GENERAL: NAD, well-developed  HEAD:  Atraumatic, Normocephalic  EYES:  pink conjunctiva and anicteric sclera   NECK: Supple, No JVD  CHEST/LUNG: Clear to auscultation bilaterally; No wheeze  HEART: Regular rate and rhythm; No murmurs, rubs, or gallops  ABDOMEN: Soft, Nontender, Nondistended; Bowel sounds present  EXTREMITIES:  2+ Peripheral Pulses, No clubbing, cyanosis, or edema  SKIN: DAVID drain in place   PSYCH: AAOx3    LABS:                        9.8    7.14  )-----------( 353      ( 16 Sep 2022 07:58 )             31.0     09-16    140  |  101  |  12  ----------------------------<  90  4.3   |  30  |  0.86    Ca    9.0      16 Sep 2022 07:58  Phos  3.6     09-15  Mg     2.0     09-15    TPro  6.5  /  Alb  3.3  /  TBili  0.2  /  DBili  x   /  AST  38  /  ALT  34  /  AlkPhos  107  09-15              RADIOLOGY & ADDITIONAL TESTS:    Consultant(s) Notes Reviewed:    Ortho:   A/P:  46y M s/p T8-T10 Lami w/ T6-11 PSF 9/11 now s/p PICC placement for o/p Abx    -Pain control prn  - DVT ppx  - WBAT  - PT/OOB as tolerated   - ID recs appreciated; picc placed  - FU HMV output; possible removal today  - FU am labs  - Med mgmt, continue home meds.  - D/c pending HMV removal and medical stable  -Ortho stable for o/p FU when drain removed  -Begin D/C planning if stable and  progressing well with PT.     Care Discussed with Consultants/Other Providers:   Patient is a 46y old  Male who presents with a chief complaint of Paraspinal abscess on CT (16 Sep 2022 06:30)      SUBJECTIVE / OVERNIGHT EVENTS: No acute events overnight. Pt was seen and examined at bedside this morning. Pt reports sleeping well and is in good spirits. Endorses improving mid/lower back pain with movement. Denies headache, nausea, vomiting, CP, sob, palpitations, changes in BM or dysuria. Will discuss with orthopedic surgery for timeline on drain removal. Per Dr. Fitzgerald, will treat MSSA bactermia w. nafcillin 2 g q4h. PICC in place.     MEDICATIONS  (STANDING):  acetaminophen     Tablet .. 650 milliGRAM(s) Oral every 6 hours  DAPTOmycin IVPB 700 milliGRAM(s) IV Intermittent every 24 hours  enoxaparin Injectable 40 milliGRAM(s) SubCutaneous every 24 hours  multivitamin 1 Tablet(s) Oral daily  pantoprazole    Tablet 40 milliGRAM(s) Oral before breakfast  polyethylene glycol 3350 17 Gram(s) Oral daily  senna 2 Tablet(s) Oral at bedtime  sodium chloride 0.9%. 1000 milliLiter(s) (75 mL/Hr) IV Continuous <Continuous>    MEDICATIONS  (PRN):  aluminum hydroxide/magnesium hydroxide/simethicone Suspension 30 milliLiter(s) Oral every 12 hours PRN Indigestion  cyclobenzaprine 5 milliGRAM(s) Oral three times a day PRN Muscle Spasm  HYDROmorphone  Injectable 0.5 milliGRAM(s) SubCutaneous every 6 hours PRN Severe Pain (7 - 10)  magnesium hydroxide Suspension 30 milliLiter(s) Oral every 12 hours PRN Constipation  ondansetron   Disintegrating Tablet 4 milliGRAM(s) Oral every 6 hours PRN Nausea and/or Vomiting  oxyCODONE    IR 5 milliGRAM(s) Oral every 4 hours PRN Mild Pain (1 - 3)  oxyCODONE    IR 10 milliGRAM(s) Oral every 4 hours PRN Moderate Pain (4 - 6)      Vital Signs Last 24 Hrs  T(C): 36.7 (16 Sep 2022 09:02), Max: 36.9 (15 Sep 2022 23:32)  T(F): 98 (16 Sep 2022 09:02), Max: 98.4 (15 Sep 2022 23:32)  HR: 85 (16 Sep 2022 09:02) (83 - 98)  BP: 106/66 (16 Sep 2022 09:02) (101/66 - 130/88)  BP(mean): --  RR: 18 (16 Sep 2022 09:02) (18 - 18)  SpO2: 96% (16 Sep 2022 09:02) (96% - 98%)    Parameters below as of 16 Sep 2022 09:02  Patient On (Oxygen Delivery Method): room air      CAPILLARY BLOOD GLUCOSE        I&O's Summary    15 Sep 2022 07:01  -  16 Sep 2022 07:00  --------------------------------------------------------  IN: 880 mL / OUT: 32 mL / NET: 848 mL    16 Sep 2022 07:01  -  16 Sep 2022 11:37  --------------------------------------------------------  IN: 480 mL / OUT: 0 mL / NET: 480 mL        PHYSICAL EXAM:  GENERAL: NAD, well-developed  HEAD:  Atraumatic, Normocephalic  EYES:  pink conjunctiva and anicteric sclera   NECK: Supple, No JVD  CHEST/LUNG: Clear to auscultation bilaterally; No wheeze  HEART: Regular rate and rhythm; No murmurs, rubs, or gallops  ABDOMEN: Soft, Nontender, Nondistended; Bowel sounds present  EXTREMITIES:  2+ Peripheral Pulses, No clubbing, cyanosis, or edema  SKIN: DAVID drain in place   PSYCH: AAOx3    LABS:                        9.8    7.14  )-----------( 353      ( 16 Sep 2022 07:58 )             31.0     09-16    140  |  101  |  12  ----------------------------<  90  4.3   |  30  |  0.86    Ca    9.0      16 Sep 2022 07:58  Phos  3.6     09-15  Mg     2.0     09-15    TPro  6.5  /  Alb  3.3  /  TBili  0.2  /  DBili  x   /  AST  38  /  ALT  34  /  AlkPhos  107  09-15              RADIOLOGY & ADDITIONAL TESTS:    Consultant(s) Notes Reviewed:    Ortho:   A/P:  46y M s/p T8-T10 Lami w/ T6-11 PSF 9/11 now s/p PICC placement for o/p Abx    -Pain control prn  - DVT ppx  - WBAT  - PT/OOB as tolerated   - ID recs appreciated; picc placed  - FU HMV output; possible removal today  - FU am labs  - Med mgmt, continue home meds.  - D/c pending HMV removal and medical stable  -Ortho stable for o/p FU when drain removed  -Begin D/C planning if stable and  progressing well with PT.     Care Discussed with Consultants/Other Providers:   Patient is a 46y old  Male who presents with a chief complaint of Paraspinal abscess on CT (16 Sep 2022 06:30)      SUBJECTIVE / OVERNIGHT EVENTS: No acute events overnight. Pt was seen and examined at bedside this morning. Pt reports sleeping well and is in good spirits. Endorses improving mid/lower back pain with movement. Denies headache, nausea, vomiting, CP, sob, palpitations, changes in BM or dysuria. Will discuss with orthopedic surgery for timeline on drain removal. Per Dr. Fitzgerald, will treat MSSA bactermia w. nafcillin 2 g q4h. PICC in place. Will need home infusion set up on Monday per CM.     MEDICATIONS  (STANDING):  acetaminophen     Tablet .. 650 milliGRAM(s) Oral every 6 hours  DAPTOmycin IVPB 700 milliGRAM(s) IV Intermittent every 24 hours  enoxaparin Injectable 40 milliGRAM(s) SubCutaneous every 24 hours  multivitamin 1 Tablet(s) Oral daily  pantoprazole    Tablet 40 milliGRAM(s) Oral before breakfast  polyethylene glycol 3350 17 Gram(s) Oral daily  senna 2 Tablet(s) Oral at bedtime  sodium chloride 0.9%. 1000 milliLiter(s) (75 mL/Hr) IV Continuous <Continuous>    MEDICATIONS  (PRN):  aluminum hydroxide/magnesium hydroxide/simethicone Suspension 30 milliLiter(s) Oral every 12 hours PRN Indigestion  cyclobenzaprine 5 milliGRAM(s) Oral three times a day PRN Muscle Spasm  HYDROmorphone  Injectable 0.5 milliGRAM(s) SubCutaneous every 6 hours PRN Severe Pain (7 - 10)  magnesium hydroxide Suspension 30 milliLiter(s) Oral every 12 hours PRN Constipation  ondansetron   Disintegrating Tablet 4 milliGRAM(s) Oral every 6 hours PRN Nausea and/or Vomiting  oxyCODONE    IR 5 milliGRAM(s) Oral every 4 hours PRN Mild Pain (1 - 3)  oxyCODONE    IR 10 milliGRAM(s) Oral every 4 hours PRN Moderate Pain (4 - 6)      Vital Signs Last 24 Hrs  T(C): 36.7 (16 Sep 2022 09:02), Max: 36.9 (15 Sep 2022 23:32)  T(F): 98 (16 Sep 2022 09:02), Max: 98.4 (15 Sep 2022 23:32)  HR: 85 (16 Sep 2022 09:02) (83 - 98)  BP: 106/66 (16 Sep 2022 09:02) (101/66 - 130/88)  BP(mean): --  RR: 18 (16 Sep 2022 09:02) (18 - 18)  SpO2: 96% (16 Sep 2022 09:02) (96% - 98%)    Parameters below as of 16 Sep 2022 09:02  Patient On (Oxygen Delivery Method): room air      CAPILLARY BLOOD GLUCOSE        I&O's Summary    15 Sep 2022 07:01  -  16 Sep 2022 07:00  --------------------------------------------------------  IN: 880 mL / OUT: 32 mL / NET: 848 mL    16 Sep 2022 07:01  -  16 Sep 2022 11:37  --------------------------------------------------------  IN: 480 mL / OUT: 0 mL / NET: 480 mL        PHYSICAL EXAM:  GENERAL: NAD, well-developed  HEAD:  Atraumatic, Normocephalic  EYES:  pink conjunctiva and anicteric sclera   NECK: Supple, No JVD  CHEST/LUNG: Clear to auscultation bilaterally; No wheeze  HEART: Regular rate and rhythm; No murmurs, rubs, or gallops  ABDOMEN: Soft, Nontender, Nondistended; Bowel sounds present  EXTREMITIES:  2+ Peripheral Pulses, No clubbing, cyanosis, or edema  SKIN: DAVID drain in place   PSYCH: AAOx3    LABS:                        9.8    7.14  )-----------( 353      ( 16 Sep 2022 07:58 )             31.0     09-16    140  |  101  |  12  ----------------------------<  90  4.3   |  30  |  0.86    Ca    9.0      16 Sep 2022 07:58  Phos  3.6     09-15  Mg     2.0     09-15    TPro  6.5  /  Alb  3.3  /  TBili  0.2  /  DBili  x   /  AST  38  /  ALT  34  /  AlkPhos  107  09-15              RADIOLOGY & ADDITIONAL TESTS:    Consultant(s) Notes Reviewed:    Ortho:   A/P:  46y M s/p T8-T10 Lami w/ T6-11 PSF 9/11 now s/p PICC placement for o/p Abx    -Pain control prn  - DVT ppx  - WBAT  - PT/OOB as tolerated   - ID recs appreciated; picc placed  - FU HMV output; possible removal today  - FU am labs  - Med mgmt, continue home meds.  - D/c pending HMV removal and medical stable  -Ortho stable for o/p FU when drain removed  -Begin D/C planning if stable and  progressing well with PT.     Care Discussed with Consultants/Other Providers:

## 2022-09-16 NOTE — PROGRESS NOTE ADULT - ATTENDING COMMENTS
Patient seen and examined   Labs and vitals are reviewed  progressing well  pain is much better  drain is intact   no fever  no chills   WBC near normal  Hb is 9.8 ( likely post op and drain)   CXR noted   A/P- 46 with PMHx of MSSA  bacteremia ( Not MRSA reportedly as per his out patient ID Dr. Austin)  was on Daptomycin presenting with epidural abscess s/p laminectomy and fusion(T6-11)  s/p PICC line would need extended treatment with IV antibiotic and as per ID Nafcillin q 4 here then through continuous infusion at home  Tissue pathology is pending.   DVT prophylaxis - Lovenox. Patient seen and examined   Labs and vitals are reviewed  progressing well  pain is much better  drain is intact   no fever  no chills   WBC near normal  Hb is 9.8 ( likely post op and drain)   CXR noted   A/P- 46 with PMHx of MSSA  bacteremia ( Not MRSA reportedly as per his out patient ID Dr. Austin)  was on Daptomycin presenting with epidural abscess s/p laminectomy and fusion(T6-11)  s/p PICC line would need extended treatment with IV antibiotic and as per ID Nafcillin q 4 here then through continuous infusion at home  Tissue pathology is pending.   DVT prophylaxis - Lovenox.  d/w Dr Fitzgerald who also discussed with his out patient pathology

## 2022-09-16 NOTE — PROGRESS NOTE ADULT - ASSESSMENT
46 m with staph aureus bacteremia 8/2022 at [facility], was discharged with dapto to complete the course 9/17 but then developed mid back pain and CT was done which showed T8-T9 abscess so pt was sent to the ED   afebrile, normal WBC: ESR: 89, CRP: 40    recent  MSSA bacteremia (confirmed with outpatient ID, Dr. Asutin on dapto to complete the course on 9/17 ( no source identified) now with back pain and T8-T9 abscess on outpatient CT, here MRI showed T8-T9 discitis/osteo and ventral epidural abscess, mild central stenosis, also small focus of T9-T 10 osteo  here blood cx negative and MRSA/MSSA PCR showed MSSA  s/p laminectomy 9/11 but no cultures sent  * switch to Nafcillin 2 q 4 while in the hospital and at home pt will be on continuous pump  * would extend for an 8 week course post surgery until 11/6  * pt was getting IV antibiotics at the office so did not have a line , now s/p PICC line  * pt is under ID specialist care, Dr. Austin so he will follow up with him  * weekly CBC, CMP, ESR, CRP while on antibiotics       The above assessment and plan was discussed with the primary team    Steff Fitzgerald MD  contact on teams  After 5pm and on weekends call [phone]

## 2022-09-16 NOTE — PROGRESS NOTE ADULT - PROBLEM SELECTOR PLAN 1
detected on outpatient CT imaging  -Ortho spine recs appreciated-       # MRI spine confirmed paraspinal abscess at T8-9      # OR surgery performed (9/11): T8-10 laminectomy with T6-T11 PSF  -F/u blood cultures are negative  -continue current pain medication   -PT eval: outpt PT  - Per ID, will need IV daptomycin 700 mg for 8 weeks (9/11 -   Case d/w Dr. Austin (outpt ID physician) and Dr. Fitzgerald (inpt). Per Dr. Fitzgerald, will consider Ancef now that PICC line may be placed. detected on outpatient CT imaging  -Ortho spine recs appreciated-       # MRI spine confirmed paraspinal abscess at T8-9      # OR surgery performed (9/11): T8-10 laminectomy with T6-T11 PSF  -F/u blood cultures are negative  -continue current pain medication   -PT eval: outpt PT  - Per ID, will need IV daptomycin 700 mg for 8 weeks (9/11 -   Case d/w Dr. Austin (outpt ID physician) and Dr. Fitzgerald (inpt). Per Dr. Fitzgerald, will consider Ancef vs Nafcillin vs daptomycin now that PICC line may be placed. detected on outpatient CT imaging  -Ortho spine recs appreciated-       # MRI spine confirmed paraspinal abscess at T8-9      # OR surgery performed (9/11): T8-10 laminectomy with T6-T11 PSF  -F/u blood cultures are negative  -continue current pain medication   -PT eval: outpt PT  - Per ID, will need IV daptomycin 700 mg for 8 weeks (9/11 -   Case d/w Dr. Austin (outpt ID physician) and Dr. Fitzgerald (inpt). Per Dr. Fitzgerald, will start Nafcillin (9/16 - for 8 week course (9/11 - 11/7) now that PICC line may be placed. detected on outpatient CT imaging  -Ortho spine recs appreciated-       # MRI spine confirmed epidural abscess at T8-9      # OR surgery performed (9/11): T8-10 laminectomy with T6-T11 PSF  -F/u blood cultures are negative  -continue current pain medication   -PT eval: outpt PT  Case d/w Dr. Austin (outpt ID physician) and Dr. Fitzgerald (inpt). Per Dr. Fitzgerald, will start Nafcillin (9/16 - for 8 week course (9/11 - 11/7) now that PICC line may be placed.

## 2022-09-16 NOTE — PROGRESS NOTE ADULT - SUBJECTIVE AND OBJECTIVE BOX
Follow Up:  epidural abscess    Interval History: pt afebrile,  no acute events    ROS:    All other systems negative    Constitutional: no fever, no chills  Cardiovascular:  no chest pain, no palpitation  Respiratory:  no SOB, no cough  GI:  no abd pain, no vomiting, no diarrhea  urinary: no dysuria, no hematuria, no flank pain  musculoskeletal:  controlled back pain  skin:  no rash  neurology:  no headache, no seizure        Allergies  No Known Allergies        ANTIMICROBIALS:  DAPTOmycin IVPB 700 every 24 hours      OTHER MEDS:  acetaminophen     Tablet .. 650 milliGRAM(s) Oral every 6 hours  aluminum hydroxide/magnesium hydroxide/simethicone Suspension 30 milliLiter(s) Oral every 12 hours PRN  cyclobenzaprine 5 milliGRAM(s) Oral three times a day PRN  enoxaparin Injectable 40 milliGRAM(s) SubCutaneous every 24 hours  HYDROmorphone  Injectable 0.5 milliGRAM(s) SubCutaneous every 6 hours PRN  magnesium hydroxide Suspension 30 milliLiter(s) Oral every 12 hours PRN  multivitamin 1 Tablet(s) Oral daily  ondansetron   Disintegrating Tablet 4 milliGRAM(s) Oral every 6 hours PRN  oxyCODONE    IR 10 milliGRAM(s) Oral every 4 hours PRN  oxyCODONE    IR 5 milliGRAM(s) Oral every 4 hours PRN  pantoprazole    Tablet 40 milliGRAM(s) Oral before breakfast  polyethylene glycol 3350 17 Gram(s) Oral daily  senna 2 Tablet(s) Oral at bedtime  sodium chloride 0.9%. 1000 milliLiter(s) IV Continuous <Continuous>      Vital Signs Last 24 Hrs  T(C): 36.7 (16 Sep 2022 09:02), Max: 36.9 (15 Sep 2022 23:32)  T(F): 98 (16 Sep 2022 09:02), Max: 98.4 (15 Sep 2022 23:32)  HR: 85 (16 Sep 2022 09:02) (83 - 98)  BP: 106/66 (16 Sep 2022 09:02) (101/66 - 130/88)  BP(mean): --  RR: 18 (16 Sep 2022 09:02) (18 - 18)  SpO2: 96% (16 Sep 2022 09:02) (96% - 98%)    Parameters below as of 16 Sep 2022 09:02  Patient On (Oxygen Delivery Method): room air        Physical Exam:  General:    NAD,  non toxic  Respiratory:    comfortable on RA  abd:     soft,   BS +,   no tenderness  :   no CVAT,  no suprapubic tenderness,   no  silverio  Musculoskeletal:   no joint swelling  vascular: RUE picc  Skin:    no rash                          9.8    7.14  )-----------( 353      ( 16 Sep 2022 07:58 )             31.0       09-16    140  |  101  |  12  ----------------------------<  90  4.3   |  30  |  0.86    Ca    9.0      16 Sep 2022 07:58  Phos  3.6     09-15  Mg     2.0     09-15    TPro  6.5  /  Alb  3.3  /  TBili  0.2  /  DBili  x   /  AST  38  /  ALT  34  /  AlkPhos  107  09-15          MICROBIOLOGY:  v  .Blood Blood  09-10-22   No Growth Final  --  --      .Blood Blood  09-09-22   No Growth Final  --  --      .Blood Blood  09-09-22   No Growth Final  --  --                RADIOLOGY:  Images independently visualized and reviewed personally, findings as below  < from: Xray Chest 1 View- PORTABLE-Urgent (Xray Chest 1 View- PORTABLE-Urgent .) (09.15.22 @ 19:43) >  IMPRESSION:  Tip of right upper extremity PICC is in the SVC. No pneumothorax.    < end of copied text >  < from: MR Thoracic Spine w/wo IV Cont (09.10.22 @ 14:05) >  IMPRESSION:  Findings compatible with discitis/osteomyelitis T8-9, with ventral   epidural  abscess and mild central stenosis same level.  Second small focus of T9-T10 discitis and osteomyelitis, subtle.    Final report: Agree with preliminary report.      < end of copied text >

## 2022-09-16 NOTE — PROGRESS NOTE ADULT - ASSESSMENT
Patient is a 46y old  Male who presents with a chief complaint of Paraspinal abscess on CT (16 Sep 2022 06:12)      SUBJECTIVE / OVERNIGHT EVENTS:    MEDICATIONS  (STANDING):  acetaminophen     Tablet .. 650 milliGRAM(s) Oral every 6 hours  DAPTOmycin IVPB 700 milliGRAM(s) IV Intermittent every 24 hours  enoxaparin Injectable 40 milliGRAM(s) SubCutaneous every 24 hours  multivitamin 1 Tablet(s) Oral daily  pantoprazole    Tablet 40 milliGRAM(s) Oral before breakfast  polyethylene glycol 3350 17 Gram(s) Oral daily  senna 2 Tablet(s) Oral at bedtime  sodium chloride 0.9%. 1000 milliLiter(s) (75 mL/Hr) IV Continuous <Continuous>    MEDICATIONS  (PRN):  aluminum hydroxide/magnesium hydroxide/simethicone Suspension 30 milliLiter(s) Oral every 12 hours PRN Indigestion  cyclobenzaprine 5 milliGRAM(s) Oral three times a day PRN Muscle Spasm  HYDROmorphone  Injectable 0.5 milliGRAM(s) SubCutaneous every 6 hours PRN Severe Pain (7 - 10)  magnesium hydroxide Suspension 30 milliLiter(s) Oral every 12 hours PRN Constipation  ondansetron   Disintegrating Tablet 4 milliGRAM(s) Oral every 6 hours PRN Nausea and/or Vomiting  oxyCODONE    IR 5 milliGRAM(s) Oral every 4 hours PRN Mild Pain (1 - 3)  oxyCODONE    IR 10 milliGRAM(s) Oral every 4 hours PRN Moderate Pain (4 - 6)      Vital Signs Last 24 Hrs  T(C): 36.4 (16 Sep 2022 04:11), Max: 36.9 (15 Sep 2022 23:32)  T(F): 97.6 (16 Sep 2022 04:11), Max: 98.4 (15 Sep 2022 23:32)  HR: 86 (16 Sep 2022 04:11) (83 - 98)  BP: 114/76 (16 Sep 2022 04:11) (101/66 - 130/88)  BP(mean): --  RR: 18 (16 Sep 2022 04:11) (18 - 18)  SpO2: 97% (16 Sep 2022 04:11) (95% - 98%)    Parameters below as of 16 Sep 2022 04:11  Patient On (Oxygen Delivery Method): room air      CAPILLARY BLOOD GLUCOSE        I&O's Summary    14 Sep 2022 07:01  -  15 Sep 2022 07:00  --------------------------------------------------------  IN: 970 mL / OUT: 65 mL / NET: 905 mL    15 Sep 2022 07:01  -  16 Sep 2022 06:30  --------------------------------------------------------  IN: 680 mL / OUT: 32 mL / NET: 648 mL        PHYSICAL EXAM:  GENERAL: NAD, well-developed  HEAD:  Atraumatic, Normocephalic  EYES: EOMI, PERRLA, conjunctiva and sclera clear  NECK: Supple, No JVD  CHEST/LUNG: Clear to auscultation bilaterally; No wheeze  HEART: Regular rate and rhythm; No murmurs, rubs, or gallops  ABDOMEN: Soft, Nontender, Nondistended; Bowel sounds present  EXTREMITIES:  2+ Peripheral Pulses, No clubbing, cyanosis, or edema  PSYCH: AAOx3  NEUROLOGY: non-focal  SKIN: No rashes or lesions    LABS:                        10.3   8.19  )-----------( 309      ( 15 Sep 2022 07:18 )             32.7     09-15    138  |  101  |  11  ----------------------------<  106<H>  4.3   |  30  |  0.72    Ca    9.2      15 Sep 2022 07:21  Phos  3.6     09-15  Mg     2.0     09-15    TPro  6.5  /  Alb  3.3  /  TBili  0.2  /  DBili  x   /  AST  38  /  ALT  34  /  AlkPhos  107  09-15    PT/INR - ( 14 Sep 2022 07:10 )   PT: 14.6 sec;   INR: 1.27 ratio         PTT - ( 14 Sep 2022 07:10 )  PTT:26.4 sec          RADIOLOGY & ADDITIONAL TESTS:    Imaging Personally Reviewed:    Consultant(s) Notes Reviewed:      Care Discussed with Consultants/Other Providers:   46M with PMH: recent syncope and MRSA bacteremia, currently receiving IV daptomycin EOD 09/17 presents to the ED with complaints of persistent back and rib tightness since 1 week. Patient was found to have paraspinal abscess on outpatient CT on the ventral margin of T8 and T9.Admitted for management of paraspinal abscess , Ortho and ID started following. s/p MRI-spin confirming abscess. Planed for OR today. 46M with PMH: recent syncope and MRSA bacteremia, currently receiving IV daptomycin EOD 09/17 presents to the ED with complaints of persistent back and rib tightness since 1 week. Patient was found to have paraspinal abscess on outpatient CT on the ventral margin of T8 and T9.Admitted for management of paraspinal abscess , Ortho and ID started following. s/p MRI-spin confirming abscess s/p surgery , laminectomy and fusion

## 2022-09-16 NOTE — CHART NOTE - NSCHARTNOTEFT_GEN_A_CORE
Drain Removal     47 y/o m POD5 T8-T10 laminectomy.    Patient resting comfortably in chair. Patient moved to bed.     Vital Signs  T(C): 36.8   T(F): 98.2   HR: 105   BP: 116/73   RR: 18   SpO2: 98%     Physical Exam:   General: A&Ox3, no acute distress.   Back:  patient visited for drain pull. sutures d/c'd. Hemostasis achieved, patient tolerated well, tip intact. dsg/tegaderm placed.       Assessment: 47 y/o m POD5 T8-T10 laminectomy hemovac removed.     Plan:   Plan as per primary team.

## 2022-09-16 NOTE — PROGRESS NOTE ADULT - SUBJECTIVE AND OBJECTIVE BOX
Orthopedics     Pt seen and examined at the bedside. Pain is well controlled at this time, no concerns at this time.     Vital Signs Last 24 Hrs  T(C): 36.4 (09-16-22 @ 04:11), Max: 36.9 (09-15-22 @ 23:32)  T(F): 97.6 (09-16-22 @ 04:11), Max: 98.4 (09-15-22 @ 23:32)  HR: 86 (09-16-22 @ 04:11) (83 - 98)  BP: 114/76 (09-16-22 @ 04:11) (101/66 - 130/88)  BP(mean): --  RR: 18 (09-16-22 @ 04:11) (18 - 18)  SpO2: 97% (09-16-22 @ 04:11) (95% - 98%)                        10.3   8.19  )-----------( 309      ( 15 Sep 2022 07:18 )             32.7     15 Sep 2022 07:21    138    |  101    |  11     ----------------------------<  106    4.3     |  30     |  0.72     Ca    9.2        15 Sep 2022 07:21  Phos  3.6       15 Sep 2022 07:21  Mg     2.0       15 Sep 2022 07:21    TPro  6.5    /  Alb  3.3    /  TBili  0.2    /  DBili  x      /  AST  38     /  ALT  34     /  AlkPhos  107    15 Sep 2022 07:21    PT/INR - ( 14 Sep 2022 07:10 )   PT: 14.6 sec;   INR: 1.27 ratio         PTT - ( 14 Sep 2022 07:10 )  PTT:26.4 sec    Exam:    GEN: NAD, AAOx3    SPINE:  Dressing c/d/i, HMV drain in place  Grossly moving all extremities  + Median/Radial/Ulnar/Musculocutaneous/Axillary nerves intact  + Hip Flexors/Quads/Hamstrings/TA/EHL/FHL/GS  SILT C5-T1  SILT L2-S1  + Radial Pulse  + DP/PT Pulses  No saddle anesthesia    Motor:                          Deltoid       Biceps      Triceps      Wrist Ext      Finger Flex    Finger Abduction   RIGHT             5/5             5/5             5/5             5/5                 5/5                     5/5  LEFT                5/5             5/5             5/5             5/5                 5/5                     5/5                          Hip Flex       Knee Ext        Knee Flex     Dorsiflex      Hallux Ext        PlantarFlex  RIGHT            5/5                5/5                 5/5               5/5                 5/5                    5/5  LEFT               5/5                5/5                 5/5               5/5                 5/5                    5/5      Sensory:                      C5      C6      C7      C8       T1          RIGHT          2         2        2         2         2          (0=absent, 1=impaired, 2=normal, NT=not testable)  LEFT             2         2        2         2         2          (0=absent, 1=impaired, 2=normal, NT=not testable)                        L2        L3       L4      L5       S1          RIGHT        2          2         2        2        2           (0=absent, 1=impaired, 2=normal, NT=not testable)  LEFT           2          2         2        2        2           (0=absent, 1=impaired, 2=normal, NT=not testable)    Negative Hoang's sign bilaterally  Negative Babinski bilaterally   Negative Myoclonus bilaterally        A/P:  46y M s/p T8-T10 Lami w/ T6-11 PSF 9/11 now s/p PICC placement for o/p Abx    -Pain control prn  - DVT ppx  - WBAT  - PT/OOB as tolerated   - ID recs appreciated; picc placed  - FU HMV output; possible removal today  - FU am labs  - Med mgmt, continue home meds.  - D/c pending HMV removal and medical stable  -Ortho stable for o/p FU when drain removed  -Begin D/C planning if stable and  progressing well with PT.

## 2022-09-17 ENCOUNTER — TRANSCRIPTION ENCOUNTER (OUTPATIENT)
Age: 47
End: 2022-09-17

## 2022-09-17 DIAGNOSIS — M86.60 OTHER CHRONIC OSTEOMYELITIS, UNSPECIFIED SITE: ICD-10-CM

## 2022-09-17 LAB
ANION GAP SERPL CALC-SCNC: 11 MMOL/L — SIGNIFICANT CHANGE UP (ref 5–17)
APTT BLD: 28.3 SEC — SIGNIFICANT CHANGE UP (ref 27.5–35.5)
BUN SERPL-MCNC: 14 MG/DL — SIGNIFICANT CHANGE UP (ref 7–23)
CALCIUM SERPL-MCNC: 9.3 MG/DL — SIGNIFICANT CHANGE UP (ref 8.4–10.5)
CHLORIDE SERPL-SCNC: 100 MMOL/L — SIGNIFICANT CHANGE UP (ref 96–108)
CO2 SERPL-SCNC: 28 MMOL/L — SIGNIFICANT CHANGE UP (ref 22–31)
CREAT SERPL-MCNC: 0.82 MG/DL — SIGNIFICANT CHANGE UP (ref 0.5–1.3)
EGFR: 108 ML/MIN/1.73M2 — SIGNIFICANT CHANGE UP
GLUCOSE SERPL-MCNC: 90 MG/DL — SIGNIFICANT CHANGE UP (ref 70–99)
HCT VFR BLD CALC: 33 % — LOW (ref 39–50)
HGB BLD-MCNC: 10.2 G/DL — LOW (ref 13–17)
INR BLD: 1.15 RATIO — SIGNIFICANT CHANGE UP (ref 0.88–1.16)
MAGNESIUM SERPL-MCNC: 2.2 MG/DL — SIGNIFICANT CHANGE UP (ref 1.6–2.6)
MCHC RBC-ENTMCNC: 27.6 PG — SIGNIFICANT CHANGE UP (ref 27–34)
MCHC RBC-ENTMCNC: 30.9 GM/DL — LOW (ref 32–36)
MCV RBC AUTO: 89.4 FL — SIGNIFICANT CHANGE UP (ref 80–100)
MRSA PCR RESULT.: SIGNIFICANT CHANGE UP
NRBC # BLD: 0 /100 WBCS — SIGNIFICANT CHANGE UP (ref 0–0)
PHOSPHATE SERPL-MCNC: 3.4 MG/DL — SIGNIFICANT CHANGE UP (ref 2.5–4.5)
PLATELET # BLD AUTO: 431 K/UL — HIGH (ref 150–400)
POTASSIUM SERPL-MCNC: 4.1 MMOL/L — SIGNIFICANT CHANGE UP (ref 3.5–5.3)
POTASSIUM SERPL-SCNC: 4.1 MMOL/L — SIGNIFICANT CHANGE UP (ref 3.5–5.3)
PROTHROM AB SERPL-ACNC: 13.2 SEC — SIGNIFICANT CHANGE UP (ref 10.5–13.4)
RBC # BLD: 3.69 M/UL — LOW (ref 4.2–5.8)
RBC # FLD: 12.9 % — SIGNIFICANT CHANGE UP (ref 10.3–14.5)
S AUREUS DNA NOSE QL NAA+PROBE: SIGNIFICANT CHANGE UP
SODIUM SERPL-SCNC: 139 MMOL/L — SIGNIFICANT CHANGE UP (ref 135–145)
WBC # BLD: 8.73 K/UL — SIGNIFICANT CHANGE UP (ref 3.8–10.5)
WBC # FLD AUTO: 8.73 K/UL — SIGNIFICANT CHANGE UP (ref 3.8–10.5)

## 2022-09-17 PROCEDURE — 99232 SBSQ HOSP IP/OBS MODERATE 35: CPT

## 2022-09-17 RX ORDER — CHLORHEXIDINE GLUCONATE 213 G/1000ML
1 SOLUTION TOPICAL DAILY
Refills: 0 | Status: DISCONTINUED | OUTPATIENT
Start: 2022-09-17 | End: 2022-09-18

## 2022-09-17 RX ADMIN — NAFCILLIN 200 GRAM(S): 10 INJECTION, POWDER, FOR SOLUTION INTRAVENOUS at 16:04

## 2022-09-17 RX ADMIN — NAFCILLIN 200 GRAM(S): 10 INJECTION, POWDER, FOR SOLUTION INTRAVENOUS at 00:09

## 2022-09-17 RX ADMIN — OXYCODONE HYDROCHLORIDE 10 MILLIGRAM(S): 5 TABLET ORAL at 00:09

## 2022-09-17 RX ADMIN — SENNA PLUS 2 TABLET(S): 8.6 TABLET ORAL at 22:11

## 2022-09-17 RX ADMIN — OXYCODONE HYDROCHLORIDE 10 MILLIGRAM(S): 5 TABLET ORAL at 10:38

## 2022-09-17 RX ADMIN — OXYCODONE HYDROCHLORIDE 10 MILLIGRAM(S): 5 TABLET ORAL at 00:42

## 2022-09-17 RX ADMIN — PANTOPRAZOLE SODIUM 40 MILLIGRAM(S): 20 TABLET, DELAYED RELEASE ORAL at 06:32

## 2022-09-17 RX ADMIN — OXYCODONE HYDROCHLORIDE 10 MILLIGRAM(S): 5 TABLET ORAL at 20:28

## 2022-09-17 RX ADMIN — POLYETHYLENE GLYCOL 3350 17 GRAM(S): 17 POWDER, FOR SOLUTION ORAL at 11:43

## 2022-09-17 RX ADMIN — Medication 650 MILLIGRAM(S): at 22:10

## 2022-09-17 RX ADMIN — NAFCILLIN 200 GRAM(S): 10 INJECTION, POWDER, FOR SOLUTION INTRAVENOUS at 04:12

## 2022-09-17 RX ADMIN — NAFCILLIN 200 GRAM(S): 10 INJECTION, POWDER, FOR SOLUTION INTRAVENOUS at 08:19

## 2022-09-17 RX ADMIN — Medication 650 MILLIGRAM(S): at 11:30

## 2022-09-17 RX ADMIN — Medication 650 MILLIGRAM(S): at 17:00

## 2022-09-17 RX ADMIN — Medication 250 MILLIGRAM(S): at 10:39

## 2022-09-17 RX ADMIN — Medication 1 TABLET(S): at 11:42

## 2022-09-17 RX ADMIN — Medication 650 MILLIGRAM(S): at 06:32

## 2022-09-17 RX ADMIN — NAFCILLIN 200 GRAM(S): 10 INJECTION, POWDER, FOR SOLUTION INTRAVENOUS at 20:28

## 2022-09-17 RX ADMIN — OXYCODONE HYDROCHLORIDE 10 MILLIGRAM(S): 5 TABLET ORAL at 11:30

## 2022-09-17 RX ADMIN — NAFCILLIN 200 GRAM(S): 10 INJECTION, POWDER, FOR SOLUTION INTRAVENOUS at 11:43

## 2022-09-17 RX ADMIN — Medication 250 MILLIGRAM(S): at 22:11

## 2022-09-17 RX ADMIN — OXYCODONE HYDROCHLORIDE 10 MILLIGRAM(S): 5 TABLET ORAL at 21:00

## 2022-09-17 RX ADMIN — CYCLOBENZAPRINE HYDROCHLORIDE 5 MILLIGRAM(S): 10 TABLET, FILM COATED ORAL at 06:32

## 2022-09-17 RX ADMIN — CHLORHEXIDINE GLUCONATE 1 APPLICATION(S): 213 SOLUTION TOPICAL at 11:43

## 2022-09-17 RX ADMIN — Medication 650 MILLIGRAM(S): at 10:39

## 2022-09-17 NOTE — DISCHARGE NOTE NURSING/CASE MANAGEMENT/SOCIAL WORK - PATIENT PORTAL LINK FT
You can access the FollowMyHealth Patient Portal offered by [facility] by registering at the following website: [email or web address]. By joining [employer]’s FollowMyHealth portal, you will also be able to view your health information using other applications (apps) compatible with our system.

## 2022-09-17 NOTE — PROGRESS NOTE ADULT - SUBJECTIVE AND OBJECTIVE BOX
PROGRESS NOTE:   Authored by ROLF Esteves PGY1 Pager: [phone]    Patient is a 46y old  Male who presents with a chief complaint of Paraspinal abscess on CT (17 Sep 2022 07:02)      SUBJECTIVE / OVERNIGHT EVENTS:  No acute events overnight. Hemovac removed.    MEDICATIONS  (STANDING):  acetaminophen     Tablet .. 650 milliGRAM(s) Oral every 6 hours  enoxaparin Injectable 40 milliGRAM(s) SubCutaneous every 24 hours  multivitamin 1 Tablet(s) Oral daily  nafcillin  IVPB      nafcillin  IVPB 2 Gram(s) IV Intermittent every 4 hours  pantoprazole    Tablet 40 milliGRAM(s) Oral before breakfast  polyethylene glycol 3350 17 Gram(s) Oral daily  saccharomyces boulardii 250 milliGRAM(s) Oral two times a day  senna 2 Tablet(s) Oral at bedtime  sodium chloride 0.9%. 1000 milliLiter(s) (75 mL/Hr) IV Continuous <Continuous>    MEDICATIONS  (PRN):  aluminum hydroxide/magnesium hydroxide/simethicone Suspension 30 milliLiter(s) Oral every 12 hours PRN Indigestion  cyclobenzaprine 5 milliGRAM(s) Oral three times a day PRN Muscle Spasm  HYDROmorphone  Injectable 0.5 milliGRAM(s) SubCutaneous every 6 hours PRN Severe Pain (7 - 10)  magnesium hydroxide Suspension 30 milliLiter(s) Oral every 12 hours PRN Constipation  ondansetron   Disintegrating Tablet 4 milliGRAM(s) Oral every 6 hours PRN Nausea and/or Vomiting  oxyCODONE    IR 10 milliGRAM(s) Oral every 4 hours PRN Moderate Pain (4 - 6)  oxyCODONE    IR 5 milliGRAM(s) Oral every 4 hours PRN Mild Pain (1 - 3)      CAPILLARY BLOOD GLUCOSE        I&O's Summary    16 Sep 2022 07:01  -  17 Sep 2022 07:00  --------------------------------------------------------  IN: 1230 mL / OUT: 5 mL / NET: 1225 mL        Vital Signs Last 24 Hrs  T(C): 36.7 (17 Sep 2022 08:12), Max: 36.9 (16 Sep 2022 20:00)  T(F): 98 (17 Sep 2022 08:12), Max: 98.4 (16 Sep 2022 20:00)  HR: 97 (17 Sep 2022 08:12) (85 - 105)  BP: 105/71 (17 Sep 2022 08:12) (96/69 - 120/82)  BP(mean): --  RR: 18 (17 Sep 2022 08:12) (18 - 18)  SpO2: 97% (17 Sep 2022 08:12) (95% - 99%)    Parameters below as of 17 Sep 2022 08:12  Patient On (Oxygen Delivery Method): room air    PHYSICAL EXAM:  GENERAL: NAD, well-developed  HEAD:  Atraumatic, Normocephalic  EYES:  pink conjunctiva and anicteric sclera   NECK: Supple, No JVD  CHEST/LUNG: Clear to auscultation bilaterally; No wheeze  HEART: Regular rate and rhythm; No murmurs, rubs, or gallops  ABDOMEN: Soft, Nontender, Nondistended; Bowel sounds present  EXTREMITIES:  2+ Peripheral Pulses, No clubbing, cyanosis, or edema  PSYCH: AAOx3    LABS:                        10.2   8.73  )-----------( 431      ( 17 Sep 2022 07:37 )             33.0     09-17    139  |  100  |  14  ----------------------------<  90  4.1   |  28  |  0.82    Ca    9.3      17 Sep 2022 07:35  Phos  3.4     09-17  Mg     2.2     09-17      PT/INR - ( 17 Sep 2022 07:37 )   PT: 13.2 sec;   INR: 1.15 ratio         PTT - ( 17 Sep 2022 07:37 )  PTT:28.3 sec            RADIOLOGY & ADDITIONAL TESTS:  Results Reviewed:   Imaging Personally Reviewed:  Electrocardiogram Personally Reviewed:    COORDINATION OF CARE:  Care Discussed with Consultants/Other Providers [Y/N]:  Prior or Outpatient Records Reviewed [Y/N]:

## 2022-09-17 NOTE — PROGRESS NOTE ADULT - PROBLEM SELECTOR PLAN 2
MRSA bacteremia tx at [facility]   TTE negative, no clear source at that time, now currently tx for epidural abscess  -F/u blood cultures  (9/12): Per ID, daptomycin for 8 weeks s/p OR procedure (9/11 -   PICC placed; per ID, dapto --> nafcillin (9/16 - for 8 weeks s/p OR procedure (9/11 - 11/7)

## 2022-09-17 NOTE — PROGRESS NOTE ADULT - PROBLEM SELECTOR PLAN 1
detected on outpatient CT imaging  -Ortho spine recs appreciated-       # MRI spine confirmed epidural abscess at T8-9      # OR surgery performed (9/11): T8-10 laminectomy with T6-T11 PSF  -F/u blood cultures are negative  -continue current pain medication   -PT eval: outpt PT  Case d/w Dr. Austin (outpt ID physician) and Dr. Fitzgerald (inpt). Per Dr. Fitzgerald, will start Nafcillin (9/16 - for 8 week course (9/11 - 11/7) now that PICC line may be placed.

## 2022-09-17 NOTE — PROGRESS NOTE ADULT - SUBJECTIVE AND OBJECTIVE BOX
Patient is a 46y old  Male who presents with a chief complaint of Paraspinal abscess on CT  Patient s/p T8-T10 Lami, T6-T11 PSF POD#6   Patient comfortable  No complaints    T(C): 36.4 (09-17-22 @ 06:27), Max: 36.9 (09-16-22 @ 20:00)  HR: 96 (09-17-22 @ 06:27) (85 - 105)  BP: 106/60 (09-17-22 @ 06:27) (96/69 - 120/82)  RR: 18 (09-17-22 @ 06:27) (18 - 18)  SpO2: 97% (09-17-22 @ 06:27) (95% - 99%)  Wt(kg): --    PHYSICAL EXAM:  NAD, Alert  Back: Dressing C/D/I; sensation grossly intact to light touch; (+) Distal Pulses; No Calf tenderness B/L, PAS        [ ] Upper extremity                    Bi          Tri        Delt                                                    R         5/5        5/5        5/5                                               L          5/5        5/5        5/5             [ ] Lower extremeity                  PF          DF         EHL       FHL                                                                                            R        5/5        5/5        5/5       5/5                                                        L         5/5        5/5        5/5       5/5      LABS:                     9.8    7.14  )-----------( 353      ( 16 Sep 2022 07:58 )             31.0   09-16    140  |  101  |  12  ----------------------------<  90  4.3   |  30  |  0.86    Ca    9.0      16 Sep 2022 07:58  Phos  3.6     09-15  Mg     2.0     09-15  TPro  6.5  /  Alb  3.3  /  TBili  0.2  /  DBili  x   /  AST  38  /  ALT  34  /  AlkPhos  107  09-15

## 2022-09-17 NOTE — PROGRESS NOTE ADULT - ASSESSMENT
46M with PMH: recent syncope and MRSA bacteremia, currently receiving IV daptomycin EOD 09/17 presents to the ED with complaints of persistent back and rib tightness since 1 week. Patient was found to have paraspinal abscess on outpatient CT on the ventral margin of T8 and T9.Admitted for management of paraspinal abscess , Ortho and ID started following. s/p MRI-spin confirming abscess s/p surgery , laminectomy and fusion

## 2022-09-17 NOTE — PROGRESS NOTE ADULT - ATTENDING COMMENTS
Patient seen and examined.  Labs and vitals reviewed.  Reports feeling well.  Patient with epidural abscess and chronic osteomyelitis now s/p drain removal. Recent MSSA bacteremia, so will DC on Nafcillin via PICC with plan for 8 weeks from OR date.  Home infusion RN set up for Sunday 9/18. Patient feels well with no infectious or neurologic signs.  Plan for DC in AM with close follow up with Dr. Austin, ID, to manage abx.

## 2022-09-17 NOTE — DISCHARGE NOTE NURSING/CASE MANAGEMENT/SOCIAL WORK - NSDCFUADDAPPT_GEN_ALL_CORE_FT
Please follow up with your orthopedic surgeon (Dr. Balbuena) and Infectious Disease clinician (Dr. Austin) in 1 week

## 2022-09-17 NOTE — PROGRESS NOTE ADULT - ASSESSMENT
47 y/o M s/p T8-T10 Lami, T6-T11 PSF POD#6, ortho stable for d/c f/u Dr. Sree connor 1 week  Rachell Beauchamp PA-C  Orthopaedic Surgery  Team pager [phone]  [facility] [phone]  [phone]

## 2022-09-18 VITALS
SYSTOLIC BLOOD PRESSURE: 105 MMHG | TEMPERATURE: 98 F | DIASTOLIC BLOOD PRESSURE: 69 MMHG | OXYGEN SATURATION: 97 % | RESPIRATION RATE: 18 BRPM | HEART RATE: 93 BPM

## 2022-09-18 PROCEDURE — 72157 MRI CHEST SPINE W/O & W/DYE: CPT | Mod: MA

## 2022-09-18 PROCEDURE — C1769: CPT

## 2022-09-18 PROCEDURE — C1751: CPT

## 2022-09-18 PROCEDURE — 99285 EMERGENCY DEPT VISIT HI MDM: CPT

## 2022-09-18 PROCEDURE — 85018 HEMOGLOBIN: CPT

## 2022-09-18 PROCEDURE — 87040 BLOOD CULTURE FOR BACTERIA: CPT

## 2022-09-18 PROCEDURE — 36569 INSJ PICC 5 YR+ W/O IMAGING: CPT

## 2022-09-18 PROCEDURE — 85610 PROTHROMBIN TIME: CPT

## 2022-09-18 PROCEDURE — C1713: CPT

## 2022-09-18 PROCEDURE — 87637 SARSCOV2&INF A&B&RSV AMP PRB: CPT

## 2022-09-18 PROCEDURE — 85730 THROMBOPLASTIN TIME PARTIAL: CPT

## 2022-09-18 PROCEDURE — 97530 THERAPEUTIC ACTIVITIES: CPT

## 2022-09-18 PROCEDURE — 88304 TISSUE EXAM BY PATHOLOGIST: CPT

## 2022-09-18 PROCEDURE — 76000 FLUOROSCOPY <1 HR PHYS/QHP: CPT

## 2022-09-18 PROCEDURE — 80053 COMPREHEN METABOLIC PANEL: CPT

## 2022-09-18 PROCEDURE — 82947 ASSAY GLUCOSE BLOOD QUANT: CPT

## 2022-09-18 PROCEDURE — 85014 HEMATOCRIT: CPT

## 2022-09-18 PROCEDURE — 86900 BLOOD TYPING SEROLOGIC ABO: CPT

## 2022-09-18 PROCEDURE — 85027 COMPLETE CBC AUTOMATED: CPT

## 2022-09-18 PROCEDURE — 86140 C-REACTIVE PROTEIN: CPT

## 2022-09-18 PROCEDURE — 87641 MR-STAPH DNA AMP PROBE: CPT

## 2022-09-18 PROCEDURE — 87640 STAPH A DNA AMP PROBE: CPT

## 2022-09-18 PROCEDURE — 86850 RBC ANTIBODY SCREEN: CPT

## 2022-09-18 PROCEDURE — 97161 PT EVAL LOW COMPLEX 20 MIN: CPT

## 2022-09-18 PROCEDURE — 86901 BLOOD TYPING SEROLOGIC RH(D): CPT

## 2022-09-18 PROCEDURE — 82803 BLOOD GASES ANY COMBINATION: CPT

## 2022-09-18 PROCEDURE — 97116 GAIT TRAINING THERAPY: CPT

## 2022-09-18 PROCEDURE — 82330 ASSAY OF CALCIUM: CPT

## 2022-09-18 PROCEDURE — 82435 ASSAY OF BLOOD CHLORIDE: CPT

## 2022-09-18 PROCEDURE — 72158 MRI LUMBAR SPINE W/O & W/DYE: CPT | Mod: MA

## 2022-09-18 PROCEDURE — 84132 ASSAY OF SERUM POTASSIUM: CPT

## 2022-09-18 PROCEDURE — 85652 RBC SED RATE AUTOMATED: CPT

## 2022-09-18 PROCEDURE — U0003: CPT

## 2022-09-18 PROCEDURE — 36415 COLL VENOUS BLD VENIPUNCTURE: CPT

## 2022-09-18 PROCEDURE — 82550 ASSAY OF CK (CPK): CPT

## 2022-09-18 PROCEDURE — 84295 ASSAY OF SERUM SODIUM: CPT

## 2022-09-18 PROCEDURE — 83605 ASSAY OF LACTIC ACID: CPT

## 2022-09-18 PROCEDURE — C9399: CPT

## 2022-09-18 PROCEDURE — 85025 COMPLETE CBC W/AUTO DIFF WBC: CPT

## 2022-09-18 PROCEDURE — 84100 ASSAY OF PHOSPHORUS: CPT

## 2022-09-18 PROCEDURE — C1889: CPT

## 2022-09-18 PROCEDURE — 97165 OT EVAL LOW COMPLEX 30 MIN: CPT

## 2022-09-18 PROCEDURE — 72156 MRI NECK SPINE W/O & W/DYE: CPT | Mod: MA

## 2022-09-18 PROCEDURE — 71045 X-RAY EXAM CHEST 1 VIEW: CPT

## 2022-09-18 PROCEDURE — U0005: CPT

## 2022-09-18 PROCEDURE — 80048 BASIC METABOLIC PNL TOTAL CA: CPT

## 2022-09-18 PROCEDURE — 99232 SBSQ HOSP IP/OBS MODERATE 35: CPT

## 2022-09-18 PROCEDURE — 83735 ASSAY OF MAGNESIUM: CPT

## 2022-09-18 RX ADMIN — OXYCODONE HYDROCHLORIDE 10 MILLIGRAM(S): 5 TABLET ORAL at 11:58

## 2022-09-18 RX ADMIN — OXYCODONE HYDROCHLORIDE 10 MILLIGRAM(S): 5 TABLET ORAL at 01:30

## 2022-09-18 RX ADMIN — NAFCILLIN 200 GRAM(S): 10 INJECTION, POWDER, FOR SOLUTION INTRAVENOUS at 12:08

## 2022-09-18 RX ADMIN — Medication 650 MILLIGRAM(S): at 04:38

## 2022-09-18 RX ADMIN — OXYCODONE HYDROCHLORIDE 10 MILLIGRAM(S): 5 TABLET ORAL at 10:58

## 2022-09-18 RX ADMIN — POLYETHYLENE GLYCOL 3350 17 GRAM(S): 17 POWDER, FOR SOLUTION ORAL at 12:11

## 2022-09-18 RX ADMIN — Medication 250 MILLIGRAM(S): at 10:58

## 2022-09-18 RX ADMIN — NAFCILLIN 200 GRAM(S): 10 INJECTION, POWDER, FOR SOLUTION INTRAVENOUS at 00:47

## 2022-09-18 RX ADMIN — NAFCILLIN 200 GRAM(S): 10 INJECTION, POWDER, FOR SOLUTION INTRAVENOUS at 04:38

## 2022-09-18 RX ADMIN — Medication 1 TABLET(S): at 12:11

## 2022-09-18 RX ADMIN — Medication 650 MILLIGRAM(S): at 11:58

## 2022-09-18 RX ADMIN — NAFCILLIN 200 GRAM(S): 10 INJECTION, POWDER, FOR SOLUTION INTRAVENOUS at 08:10

## 2022-09-18 RX ADMIN — OXYCODONE HYDROCHLORIDE 10 MILLIGRAM(S): 5 TABLET ORAL at 00:47

## 2022-09-18 RX ADMIN — CHLORHEXIDINE GLUCONATE 1 APPLICATION(S): 213 SOLUTION TOPICAL at 12:10

## 2022-09-18 RX ADMIN — PANTOPRAZOLE SODIUM 40 MILLIGRAM(S): 20 TABLET, DELAYED RELEASE ORAL at 06:54

## 2022-09-18 RX ADMIN — Medication 650 MILLIGRAM(S): at 10:58

## 2022-09-18 NOTE — PROGRESS NOTE ADULT - ASSESSMENT
45 y/o M s/p T6-T10 Lami, T6-T-11 PSF POD#7, ortho stable  Rachell Beauchamp PA-C  Orthopaedic Surgery  Team pager [phone]  [facility] [phone]  [phone]

## 2022-09-18 NOTE — PROGRESS NOTE ADULT - PROBLEM SELECTOR PLAN 1
detected on outpatient CT imaging  -Ortho spine recs appreciated-       # MRI spine confirmed epidural abscess at T8-9      # OR surgery performed (9/11): T8-10 laminectomy with T6-T11 PSF  -F/u blood cultures are negative  -continue current pain medication   -PT eval: outpt PT  Case d/w Dr. Austin (outpt ID physician) and Dr. Fitzgerald (inpt). Per Dr. Fitzgerald, will start Nafcillin (9/16 - for 8 week course (9/11 - 11/7) now that PICC line may be placed. detected on outpatient CT imaging  -Ortho spine recs appreciated-       # MRI spine confirmed epidural abscess at T8-9      # OR surgery performed (9/11): T8-10 laminectomy with T6-T11 PSF  -F/u blood cultures are negative  -continue current pain medication   -PT eval: outpt PT  Case d/w Dr. Austin (outpt ID physician) and Dr. Fitzgerald (inpt). Per Dr. Fitzgerald, will start Nafcillin (9/16 - for 8 week course (9/11 - 11/6) with home infusion via PICC line

## 2022-09-18 NOTE — PROGRESS NOTE ADULT - ATTENDING COMMENTS
Agree with above, patient comfortable, awaiting d/c today for continued IV abx, no complaints  ______________  All Medrano MD  Palliative Medicine  [facility]   of Geriatric and Palliative Medicine  [phone]

## 2022-09-18 NOTE — PROGRESS NOTE ADULT - PROVIDER SPECIALTY LIST ADULT
Infectious Disease
Internal Medicine
Orthopedics
Infectious Disease
Orthopedics
Internal Medicine

## 2022-09-18 NOTE — PROGRESS NOTE ADULT - SUBJECTIVE AND OBJECTIVE BOX
Patient is a 46y old  Male who presents with a chief complaint of Paraspinal abscess on CT   Patient s/p T6-T10 Lami, T6-T-11 PSF POD#7  Patient comfortable  No complaints    T(C): 36.7 (09-18-22 @ 04:28), Max: 36.8 (09-17-22 @ 20:14)  HR: 92 (09-18-22 @ 04:28) (90 - 104)  BP: 98/66 (09-18-22 @ 04:28) (98/66 - 124/87)  RR: 18 (09-18-22 @ 04:28) (18 - 18)  SpO2: 95% (09-18-22 @ 04:28) (95% - 98%)      PHYSICAL EXAM:  NAD, Alert  Back: Dressing C/D/I; sensation grossly intact to light touch; (+) Distal Pulses; No Calf tenderness B/L, PAS       [ ] Lower extremeity                  PF          DF         EHL       FHL                                                                                            R        5/5        5/5        5/5       5/5                                                        L         5/5        5/5        5/5       5/5      LABS                    10.2   8.73  )-----------( 431      ( 17 Sep 2022 07:37 )             33.0  09-17  139  |  100  |  14  ----------------------------<  90  4.1   |  28  |  0.82  Ca    9.3      17 Sep 2022 07:35  Phos  3.4     09-17  Mg     2.2     09-17  PT/INR - ( 17 Sep 2022 07:37 )   PT: 13.2 sec;   INR: 1.15 ratio     PTT - ( 17 Sep 2022 07:37 )  PTT:28.3 sec

## 2022-09-18 NOTE — PROGRESS NOTE ADULT - SUBJECTIVE AND OBJECTIVE BOX
Patient is a 46y old  Male who presents with a chief complaint of Paraspinal abscess on CT (17 Sep 2022 08:55)      SUBJECTIVE / OVERNIGHT EVENTS:    MEDICATIONS  (STANDING):  acetaminophen     Tablet .. 650 milliGRAM(s) Oral every 6 hours  chlorhexidine 2% Cloths 1 Application(s) Topical daily  enoxaparin Injectable 40 milliGRAM(s) SubCutaneous every 24 hours  multivitamin 1 Tablet(s) Oral daily  nafcillin  IVPB      nafcillin  IVPB 2 Gram(s) IV Intermittent every 4 hours  pantoprazole    Tablet 40 milliGRAM(s) Oral before breakfast  polyethylene glycol 3350 17 Gram(s) Oral daily  saccharomyces boulardii 250 milliGRAM(s) Oral two times a day  senna 2 Tablet(s) Oral at bedtime  sodium chloride 0.9%. 1000 milliLiter(s) (75 mL/Hr) IV Continuous <Continuous>    MEDICATIONS  (PRN):  aluminum hydroxide/magnesium hydroxide/simethicone Suspension 30 milliLiter(s) Oral every 12 hours PRN Indigestion  cyclobenzaprine 5 milliGRAM(s) Oral three times a day PRN Muscle Spasm  HYDROmorphone  Injectable 0.5 milliGRAM(s) SubCutaneous every 6 hours PRN Severe Pain (7 - 10)  magnesium hydroxide Suspension 30 milliLiter(s) Oral every 12 hours PRN Constipation  ondansetron   Disintegrating Tablet 4 milliGRAM(s) Oral every 6 hours PRN Nausea and/or Vomiting  oxyCODONE    IR 5 milliGRAM(s) Oral every 4 hours PRN Mild Pain (1 - 3)  oxyCODONE    IR 10 milliGRAM(s) Oral every 4 hours PRN Moderate Pain (4 - 6)      Vital Signs Last 24 Hrs  T(C): 36.7 (18 Sep 2022 04:28), Max: 36.8 (17 Sep 2022 20:14)  T(F): 98.1 (18 Sep 2022 04:28), Max: 98.3 (18 Sep 2022 00:32)  HR: 92 (18 Sep 2022 04:28) (90 - 104)  BP: 98/66 (18 Sep 2022 04:28) (98/66 - 124/87)  BP(mean): --  RR: 18 (18 Sep 2022 04:28) (18 - 18)  SpO2: 95% (18 Sep 2022 04:28) (95% - 98%)    Parameters below as of 18 Sep 2022 04:28  Patient On (Oxygen Delivery Method): room air      CAPILLARY BLOOD GLUCOSE        I&O's Summary    16 Sep 2022 07:01  -  17 Sep 2022 07:00  --------------------------------------------------------  IN: 1230 mL / OUT: 5 mL / NET: 1225 mL    17 Sep 2022 07:01  -  18 Sep 2022 06:08  --------------------------------------------------------  IN: 960 mL / OUT: 0 mL / NET: 960 mL        PHYSICAL EXAM:  GENERAL: NAD, well-developed  HEAD:  Atraumatic, Normocephalic  EYES: EOMI, PERRLA, conjunctiva and sclera clear  NECK: Supple, No JVD  CHEST/LUNG: Clear to auscultation bilaterally; No wheeze  HEART: Regular rate and rhythm; No murmurs, rubs, or gallops  ABDOMEN: Soft, Nontender, Nondistended; Bowel sounds present  EXTREMITIES:  2+ Peripheral Pulses, No clubbing, cyanosis, or edema  PSYCH: AAOx3  NEUROLOGY: non-focal  SKIN: No rashes or lesions    LABS:                        10.2   8.73  )-----------( 431      ( 17 Sep 2022 07:37 )             33.0     09-17    139  |  100  |  14  ----------------------------<  90  4.1   |  28  |  0.82    Ca    9.3      17 Sep 2022 07:35  Phos  3.4     09-17  Mg     2.2     09-17      PT/INR - ( 17 Sep 2022 07:37 )   PT: 13.2 sec;   INR: 1.15 ratio         PTT - ( 17 Sep 2022 07:37 )  PTT:28.3 sec          RADIOLOGY & ADDITIONAL TESTS:    Imaging Personally Reviewed:    Consultant(s) Notes Reviewed:      Care Discussed with Consultants/Other Providers:   Patient is a 46y old  Male who presents with a chief complaint of Paraspinal abscess on CT (17 Sep 2022 08:55)      SUBJECTIVE / OVERNIGHT EVENTS: No acute events overnight.     MEDICATIONS  (STANDING):  acetaminophen     Tablet .. 650 milliGRAM(s) Oral every 6 hours  chlorhexidine 2% Cloths 1 Application(s) Topical daily  enoxaparin Injectable 40 milliGRAM(s) SubCutaneous every 24 hours  multivitamin 1 Tablet(s) Oral daily  nafcillin  IVPB      nafcillin  IVPB 2 Gram(s) IV Intermittent every 4 hours  pantoprazole    Tablet 40 milliGRAM(s) Oral before breakfast  polyethylene glycol 3350 17 Gram(s) Oral daily  saccharomyces boulardii 250 milliGRAM(s) Oral two times a day  senna 2 Tablet(s) Oral at bedtime  sodium chloride 0.9%. 1000 milliLiter(s) (75 mL/Hr) IV Continuous <Continuous>    MEDICATIONS  (PRN):  aluminum hydroxide/magnesium hydroxide/simethicone Suspension 30 milliLiter(s) Oral every 12 hours PRN Indigestion  cyclobenzaprine 5 milliGRAM(s) Oral three times a day PRN Muscle Spasm  HYDROmorphone  Injectable 0.5 milliGRAM(s) SubCutaneous every 6 hours PRN Severe Pain (7 - 10)  magnesium hydroxide Suspension 30 milliLiter(s) Oral every 12 hours PRN Constipation  ondansetron   Disintegrating Tablet 4 milliGRAM(s) Oral every 6 hours PRN Nausea and/or Vomiting  oxyCODONE    IR 5 milliGRAM(s) Oral every 4 hours PRN Mild Pain (1 - 3)  oxyCODONE    IR 10 milliGRAM(s) Oral every 4 hours PRN Moderate Pain (4 - 6)      Vital Signs Last 24 Hrs  T(C): 36.7 (18 Sep 2022 04:28), Max: 36.8 (17 Sep 2022 20:14)  T(F): 98.1 (18 Sep 2022 04:28), Max: 98.3 (18 Sep 2022 00:32)  HR: 92 (18 Sep 2022 04:28) (90 - 104)  BP: 98/66 (18 Sep 2022 04:28) (98/66 - 124/87)  BP(mean): --  RR: 18 (18 Sep 2022 04:28) (18 - 18)  SpO2: 95% (18 Sep 2022 04:28) (95% - 98%)    Parameters below as of 18 Sep 2022 04:28  Patient On (Oxygen Delivery Method): room air      CAPILLARY BLOOD GLUCOSE        I&O's Summary    16 Sep 2022 07:01  -  17 Sep 2022 07:00  --------------------------------------------------------  IN: 1230 mL / OUT: 5 mL / NET: 1225 mL    17 Sep 2022 07:01  -  18 Sep 2022 06:08  --------------------------------------------------------  IN: 960 mL / OUT: 0 mL / NET: 960 mL        PHYSICAL EXAM:  GENERAL: NAD, well-developed  HEAD:  Atraumatic, Normocephalic  EYES: EOMI, PERRLA, conjunctiva and sclera clear  NECK: Supple, No JVD  CHEST/LUNG: Clear to auscultation bilaterally; No wheeze  HEART: Regular rate and rhythm; No murmurs, rubs, or gallops  ABDOMEN: Soft, Nontender, Nondistended; Bowel sounds present  EXTREMITIES:  2+ Peripheral Pulses, No clubbing, cyanosis, or edema  PSYCH: AAOx3  NEUROLOGY: non-focal  SKIN: No rashes or lesions    LABS:                        10.2   8.73  )-----------( 431      ( 17 Sep 2022 07:37 )             33.0     09-17    139  |  100  |  14  ----------------------------<  90  4.1   |  28  |  0.82    Ca    9.3      17 Sep 2022 07:35  Phos  3.4     09-17  Mg     2.2     09-17      PT/INR - ( 17 Sep 2022 07:37 )   PT: 13.2 sec;   INR: 1.15 ratio         PTT - ( 17 Sep 2022 07:37 )  PTT:28.3 sec          RADIOLOGY & ADDITIONAL TESTS:    Imaging Personally Reviewed:    Consultant(s) Notes Reviewed:      Care Discussed with Consultants/Other Providers:   Patient is a 46y old  Male who presents with a chief complaint of Paraspinal abscess on CT (17 Sep 2022 08:55)      SUBJECTIVE / OVERNIGHT EVENTS: No acute events overnight. Pt was seen and examined at bedside this morning resting comfortably and in good spirits. Denies CP, SOB, palpitations, headache, nausea, vomiting, changes in BM, dysuria/incontinence, numbness or tingling. Pt will be discharged today after IV nafcillin at noon. Home infusion via PICC line set up.     MEDICATIONS  (STANDING):  acetaminophen     Tablet .. 650 milliGRAM(s) Oral every 6 hours  chlorhexidine 2% Cloths 1 Application(s) Topical daily  enoxaparin Injectable 40 milliGRAM(s) SubCutaneous every 24 hours  multivitamin 1 Tablet(s) Oral daily  nafcillin  IVPB      nafcillin  IVPB 2 Gram(s) IV Intermittent every 4 hours  pantoprazole    Tablet 40 milliGRAM(s) Oral before breakfast  polyethylene glycol 3350 17 Gram(s) Oral daily  saccharomyces boulardii 250 milliGRAM(s) Oral two times a day  senna 2 Tablet(s) Oral at bedtime  sodium chloride 0.9%. 1000 milliLiter(s) (75 mL/Hr) IV Continuous <Continuous>    MEDICATIONS  (PRN):  aluminum hydroxide/magnesium hydroxide/simethicone Suspension 30 milliLiter(s) Oral every 12 hours PRN Indigestion  cyclobenzaprine 5 milliGRAM(s) Oral three times a day PRN Muscle Spasm  HYDROmorphone  Injectable 0.5 milliGRAM(s) SubCutaneous every 6 hours PRN Severe Pain (7 - 10)  magnesium hydroxide Suspension 30 milliLiter(s) Oral every 12 hours PRN Constipation  ondansetron   Disintegrating Tablet 4 milliGRAM(s) Oral every 6 hours PRN Nausea and/or Vomiting  oxyCODONE    IR 5 milliGRAM(s) Oral every 4 hours PRN Mild Pain (1 - 3)  oxyCODONE    IR 10 milliGRAM(s) Oral every 4 hours PRN Moderate Pain (4 - 6)      Vital Signs Last 24 Hrs  T(C): 36.7 (18 Sep 2022 04:28), Max: 36.8 (17 Sep 2022 20:14)  T(F): 98.1 (18 Sep 2022 04:28), Max: 98.3 (18 Sep 2022 00:32)  HR: 92 (18 Sep 2022 04:28) (90 - 104)  BP: 98/66 (18 Sep 2022 04:28) (98/66 - 124/87)  BP(mean): --  RR: 18 (18 Sep 2022 04:28) (18 - 18)  SpO2: 95% (18 Sep 2022 04:28) (95% - 98%)    Parameters below as of 18 Sep 2022 04:28  Patient On (Oxygen Delivery Method): room air      CAPILLARY BLOOD GLUCOSE        I&O's Summary    16 Sep 2022 07:01  -  17 Sep 2022 07:00  --------------------------------------------------------  IN: 1230 mL / OUT: 5 mL / NET: 1225 mL    17 Sep 2022 07:01  -  18 Sep 2022 06:08  --------------------------------------------------------  IN: 960 mL / OUT: 0 mL / NET: 960 mL        PHYSICAL EXAM:  GENERAL: NAD, well-developed  HEAD:  Atraumatic, Normocephalic  EYES:  conjunctiva and sclera clear  NECK: Supple, No JVD  CHEST/LUNG: Clear to auscultation bilaterally; No wheeze  HEART: Regular rate and rhythm; No murmurs, rubs, or gallops  ABDOMEN: Soft, Nontender, Nondistended; Bowel sounds present  EXTREMITIES:  2+ Peripheral Pulses, No clubbing, cyanosis, or edema  PSYCH: AAOx3  NEUROLOGY: non-focal  SKIN: No rashes or lesions    LABS:                        10.2   8.73  )-----------( 431      ( 17 Sep 2022 07:37 )             33.0     09-17    139  |  100  |  14  ----------------------------<  90  4.1   |  28  |  0.82    Ca    9.3      17 Sep 2022 07:35  Phos  3.4     09-17  Mg     2.2     09-17      PT/INR - ( 17 Sep 2022 07:37 )   PT: 13.2 sec;   INR: 1.15 ratio         PTT - ( 17 Sep 2022 07:37 )  PTT:28.3 sec          RADIOLOGY & ADDITIONAL TESTS:    Imaging Personally Reviewed:    Consultant(s) Notes Reviewed:    Infectious Disease  46 m with staph aureus bacteremia 8/2022 at [facility], was discharged with dapto to complete the course 9/17 but then developed mid back pain and CT was done which showed T8-T9 abscess so pt was sent to the ED   afebrile, normal WBC: ESR: 89, CRP: 40    recent  MSSA bacteremia (confirmed with outpatient ID, Dr. Austin on dapto to complete the course on 9/17 ( no source identified) now with back pain and T8-T9 abscess on outpatient CT, here MRI showed T8-T9 discitis/osteo and ventral epidural abscess, mild central stenosis, also small focus of T9-T 10 osteo  here blood cx negative and MRSA/MSSA PCR showed MSSA  s/p laminectomy 9/11 but no cultures sent  * switch to Nafcillin 2 q 4 while in the hospital and at home pt will be on continuous pump  * would extend for an 8 week course post surgery until 11/6  * pt was getting IV antibiotics at the office so did not have a line , now s/p PICC line  * pt is under ID specialist care, Dr. Austin so he will follow up with him  * weekly CBC, CMP, ESR, CRP while on antibiotics       The above assessment and plan was discussed with the primary team    Steff Fitzgerald MD  contact on teams  After 5pm and on weekends call [phone]        Care Discussed with Consultants/Other Providers:   Patient is a 46y old  Male who presents with a chief complaint of Paraspinal abscess on CT (17 Sep 2022 08:55)      SUBJECTIVE / OVERNIGHT EVENTS: No acute events overnight. Pt was seen and examined at bedside this morning resting comfortably and in good spirits. Denies CP, SOB, palpitations, headache, nausea, vomiting, changes in BM, dysuria/incontinence, numbness or tingling. Pt will be discharged today after IV nafcillin at noon. Home infusion via PICC line set up.     MEDICATIONS  (STANDING):  acetaminophen     Tablet .. 650 milliGRAM(s) Oral every 6 hours  chlorhexidine 2% Cloths 1 Application(s) Topical daily  enoxaparin Injectable 40 milliGRAM(s) SubCutaneous every 24 hours  multivitamin 1 Tablet(s) Oral daily  nafcillin  IVPB      nafcillin  IVPB 2 Gram(s) IV Intermittent every 4 hours  pantoprazole    Tablet 40 milliGRAM(s) Oral before breakfast  polyethylene glycol 3350 17 Gram(s) Oral daily  saccharomyces boulardii 250 milliGRAM(s) Oral two times a day  senna 2 Tablet(s) Oral at bedtime  sodium chloride 0.9%. 1000 milliLiter(s) (75 mL/Hr) IV Continuous <Continuous>    MEDICATIONS  (PRN):  aluminum hydroxide/magnesium hydroxide/simethicone Suspension 30 milliLiter(s) Oral every 12 hours PRN Indigestion  cyclobenzaprine 5 milliGRAM(s) Oral three times a day PRN Muscle Spasm  HYDROmorphone  Injectable 0.5 milliGRAM(s) SubCutaneous every 6 hours PRN Severe Pain (7 - 10)  magnesium hydroxide Suspension 30 milliLiter(s) Oral every 12 hours PRN Constipation  ondansetron   Disintegrating Tablet 4 milliGRAM(s) Oral every 6 hours PRN Nausea and/or Vomiting  oxyCODONE    IR 5 milliGRAM(s) Oral every 4 hours PRN Mild Pain (1 - 3)  oxyCODONE    IR 10 milliGRAM(s) Oral every 4 hours PRN Moderate Pain (4 - 6)      Vital Signs Last 24 Hrs  T(C): 36.7 (18 Sep 2022 04:28), Max: 36.8 (17 Sep 2022 20:14)  T(F): 98.1 (18 Sep 2022 04:28), Max: 98.3 (18 Sep 2022 00:32)  HR: 92 (18 Sep 2022 04:28) (90 - 104)  BP: 98/66 (18 Sep 2022 04:28) (98/66 - 124/87)  BP(mean): --  RR: 18 (18 Sep 2022 04:28) (18 - 18)  SpO2: 95% (18 Sep 2022 04:28) (95% - 98%)    Parameters below as of 18 Sep 2022 04:28  Patient On (Oxygen Delivery Method): room air      CAPILLARY BLOOD GLUCOSE        I&O's Summary    16 Sep 2022 07:01  -  17 Sep 2022 07:00  --------------------------------------------------------  IN: 1230 mL / OUT: 5 mL / NET: 1225 mL    17 Sep 2022 07:01  -  18 Sep 2022 06:08  --------------------------------------------------------  IN: 960 mL / OUT: 0 mL / NET: 960 mL        PHYSICAL EXAM:  GENERAL: NAD, well-developed  HEAD:  Atraumatic, Normocephalic  EYES:  conjunctiva and sclera clear  NECK: Supple, No JVD  CHEST/LUNG: Clear to auscultation bilaterally; No wheeze  HEART: Regular rate and rhythm; No murmurs, rubs, or gallops  ABDOMEN: Soft, Nontender, Nondistended; Bowel sounds present  EXTREMITIES:  2+ Peripheral Pulses, No clubbing, cyanosis, or edema  PSYCH: AAOx3  NEUROLOGY: non-focal  SKIN: No rashes or lesions    LABS: reviewed, no labs for today                        10.2   8.73  )-----------( 431      ( 17 Sep 2022 07:37 )             33.0     09-17    139  |  100  |  14  ----------------------------<  90  4.1   |  28  |  0.82    Ca    9.3      17 Sep 2022 07:35  Phos  3.4     09-17  Mg     2.2     09-17      PT/INR - ( 17 Sep 2022 07:37 )   PT: 13.2 sec;   INR: 1.15 ratio         PTT - ( 17 Sep 2022 07:37 )  PTT:28.3 sec          RADIOLOGY & ADDITIONAL TESTS:    Imaging Personally Reviewed:    Consultant(s) Notes Reviewed:    Infectious Disease  46 m with staph aureus bacteremia 8/2022 at [facility], was discharged with dapto to complete the course 9/17 but then developed mid back pain and CT was done which showed T8-T9 abscess so pt was sent to the ED   afebrile, normal WBC: ESR: 89, CRP: 40    recent  MSSA bacteremia (confirmed with outpatient ID, Dr. Austin on dapto to complete the course on 9/17 ( no source identified) now with back pain and T8-T9 abscess on outpatient CT, here MRI showed T8-T9 discitis/osteo and ventral epidural abscess, mild central stenosis, also small focus of T9-T 10 osteo  here blood cx negative and MRSA/MSSA PCR showed MSSA  s/p laminectomy 9/11 but no cultures sent  * switch to Nafcillin 2 q 4 while in the hospital and at home pt will be on continuous pump  * would extend for an 8 week course post surgery until 11/6  * pt was getting IV antibiotics at the office so did not have a line , now s/p PICC line  * pt is under ID specialist care, Dr. Austin so he will follow up with him  * weekly CBC, CMP, ESR, CRP while on antibiotics       The above assessment and plan was discussed with the primary team    Steff Fitzgerald MD  contact on teams  After 5pm and on weekends call [phone]        Care Discussed with Consultants/Other Providers:

## 2022-09-18 NOTE — PROGRESS NOTE ADULT - REASON FOR ADMISSION
Paraspinal abscess on CT

## 2022-09-18 NOTE — PROGRESS NOTE ADULT - PROBLEM SELECTOR PLAN 2
MRSA bacteremia tx at [facility]   TTE negative, no clear source at that time, now currently tx for epidural abscess  -F/u blood cultures  (9/12): Per ID, daptomycin for 8 weeks s/p OR procedure (9/11 -   PICC placed; per ID, dapto --> nafcillin (9/16 - for 8 weeks s/p OR procedure (9/11 - 11/7) MRSA bacteremia tx at [facility]   TTE negative, no clear source at that time, now currently tx for epidural abscess  -F/u blood cultures  (9/12): Per ID, daptomycin for 8 weeks s/p OR procedure (9/11 -   PICC placed; per ID, dapto --> nafcillin (9/16 - for 8 weeks s/p OR procedure (9/11 - 11/6)

## 2022-09-19 PROBLEM — R78.81 BACTEREMIA: Chronic | Status: ACTIVE | Noted: 2022-09-10

## 2022-09-19 PROBLEM — R55 SYNCOPE AND COLLAPSE: Chronic | Status: ACTIVE | Noted: 2022-09-10

## 2022-09-21 ENCOUNTER — TRANSCRIPTION ENCOUNTER (OUTPATIENT)
Age: 47
End: 2022-09-21

## 2022-09-26 ENCOUNTER — APPOINTMENT (OUTPATIENT)
Dept: ORTHOPEDIC SURGERY | Facility: CLINIC | Age: 47
End: 2022-09-26

## 2022-09-26 PROCEDURE — 72070 X-RAY EXAM THORAC SPINE 2VWS: CPT

## 2022-09-26 PROCEDURE — 99024 POSTOP FOLLOW-UP VISIT: CPT

## 2022-09-26 RX ORDER — CYCLOBENZAPRINE HYDROCHLORIDE 5 MG/1
5 TABLET, FILM COATED ORAL 3 TIMES DAILY
Qty: 21 | Refills: 1 | Status: ACTIVE | COMMUNITY
Start: 2022-09-26 | End: 1900-01-01

## 2022-09-26 NOTE — PHYSICAL EXAM
[de-identified] : Lumbar Physical Exam\par \par Patient is walking well without any assistive devices\par \par Reflexes\par Patellar - normal\par Gastroc - normal\par Clonus - No\par \par Hip Exam - Normal\par \par Straight leg raise - none\par \par Pulses - 2+ dp/pt\par \par Range of motion - normal\par \par Sensation \par Sensation intact to light touch in L1, L2, L3, L4, L5 and S1 dermatomes bilaterally\par \par Motor\par 	IP	Quad	HS	TA	Gastroc	EHL\par Right	5/5	5/5	5/5	5/5	5/5	5/5\par Left	5/5	5/5	5/5	5/5	5/5	5/5\par \par Incision is clean dry and intact [de-identified] : Thoracic radiographs\par Hardware in appropriate position\par No acute complication

## 2022-09-26 NOTE — ASSESSMENT
[FreeTextEntry1] : This is a 46-year-old male that is now approximately 2 weeks out from thoracic decompression fusion for T8-T9 discitis osteomyelitis and epidural abscess.  At this point we will have him continue working on walking.  I will have him follow-up with me in approximately 2 to 3 weeks.  He should continue with Flexeril as needed for his back pain.  I encouraged him to reach out to me at any point if he has any questions or concerns in regards to his recovery.  Obviously he should continue with his IV antibiotics as well.  All questions were answered today.

## 2022-09-26 NOTE — HISTORY OF PRESENT ILLNESS
[de-identified] : This is a 38-year-old male that is now approximately 2 weeks out from thoracic decompression fusion for epidural abscess/osteomyelitis discitis.  Overall he has done remarkably well.  He states his pain is controlled with Flexeril.  He denies any bowel bladder issues.  He denies any saddle anesthesia.  He is walking more more.  He is on nafcillin for IV antibiotics.  He denies any issues with balance or hand dexterity.  Overall he states he is doing better and better each day.

## 2022-10-10 ENCOUNTER — APPOINTMENT (OUTPATIENT)
Dept: ORTHOPEDIC SURGERY | Facility: CLINIC | Age: 47
End: 2022-10-10

## 2022-10-10 VITALS
DIASTOLIC BLOOD PRESSURE: 79 MMHG | SYSTOLIC BLOOD PRESSURE: 113 MMHG | HEIGHT: 72 IN | BODY MASS INDEX: 20.99 KG/M2 | WEIGHT: 155 LBS

## 2022-10-10 PROCEDURE — 99024 POSTOP FOLLOW-UP VISIT: CPT

## 2022-10-10 NOTE — HISTORY OF PRESENT ILLNESS
[de-identified] : Today the patient states that overall he is doing well.  He denies any severe radiating type symptoms.  He is anxious to increase his activities.  He is now 4 weeks out decompression fusion procedure for epidural abscess/osteomyelitis discitis.  He denies any severe pain.  His back pain has improved as compared to her last visit.  He is regaining strength and has increased his overall activities.  He is off all narcotic pain medications\par \par 09/26/22\par This is a 38-year-old male that is now approximately 2 weeks out from thoracic decompression fusion for epidural abscess/osteomyelitis discitis.  Overall he has done remarkably well.  He states his pain is controlled with Flexeril.  He denies any bowel bladder issues.  He denies any saddle anesthesia.  He is walking more more.  He is on nafcillin for IV antibiotics.  He denies any issues with balance or hand dexterity.  Overall he states he is doing better and better each day.

## 2022-10-10 NOTE — ASSESSMENT
[FreeTextEntry1] : This is a 46-year-old male that is now approximately 4 weeks out from thoracic decompression fusion procedure for epidural abscess/osteomyelitis discitis.  I am very pleased with his recovery.  At this point we will start with gentle physical therapy.  I encouraged him to continue to walk.  I will have him follow-up with me in approximately 4 weeks for repeat clinical evaluation.  I encouraged him to reach out to me at any point if his symptoms worsen or change in any way.

## 2022-10-10 NOTE — PHYSICAL EXAM
[de-identified] : Lumbar Physical Exam\par \par Patient is walking well without any assistive devices\par \par Reflexes\par Patellar - normal\par Gastroc - normal\par Clonus - No\par \par Hip Exam - Normal\par \par Straight leg raise - none\par \par Pulses - 2+ dp/pt\par \par Range of motion - normal\par \par Sensation \par Sensation intact to light touch in L1, L2, L3, L4, L5 and S1 dermatomes bilaterally\par \par Motor\par 	IP	Quad	HS	TA	Gastroc	EHL\par Right	5/5	5/5	5/5	5/5	5/5	5/5\par Left	5/5	5/5	5/5	5/5	5/5	5/5\par \par Incision is clean dry and intact [de-identified] : Thoracic radiographs\par Hardware in appropriate position\par No acute complication

## 2022-11-09 ENCOUNTER — APPOINTMENT (OUTPATIENT)
Dept: ORTHOPEDIC SURGERY | Facility: CLINIC | Age: 47
End: 2022-11-09

## 2022-11-09 VITALS
HEIGHT: 72 IN | BODY MASS INDEX: 20.99 KG/M2 | WEIGHT: 155 LBS | DIASTOLIC BLOOD PRESSURE: 82 MMHG | SYSTOLIC BLOOD PRESSURE: 117 MMHG

## 2022-11-09 PROCEDURE — 72070 X-RAY EXAM THORAC SPINE 2VWS: CPT

## 2022-11-09 PROCEDURE — 99024 POSTOP FOLLOW-UP VISIT: CPT

## 2022-11-09 NOTE — PHYSICAL EXAM
[de-identified] : Lumbar Physical Exam\par \par Patient is walking well without any assistive devices\par \par Reflexes\par Patellar - normal\par Gastroc - normal\par Clonus - No\par \par Hip Exam - Normal\par \par Straight leg raise - none\par \par Pulses - 2+ dp/pt\par \par Range of motion - normal\par \par Sensation \par Sensation intact to light touch in L1, L2, L3, L4, L5 and S1 dermatomes bilaterally\par \par Motor\par 	IP	Quad	HS	TA	Gastroc	EHL\par Right	5/5	5/5	5/5	5/5	5/5	5/5\par Left	5/5	5/5	5/5	5/5	5/5	5/5\par \par Incision is clean dry and intact [de-identified] : Thoracic radiographs\par Hardware in appropriate position\par No acute complication

## 2022-11-09 NOTE — ASSESSMENT
[FreeTextEntry1] : This is a 46-year-old male that is now approximately 2 months out from his thoracic decompression fusion surgery.  Overall he has done remarkably well.  At this point I would encourage him to walk and do his activity daily living.  He should continue physical therapy.  I will see him back in 3 months.  Encouraged to reach out at any point if his symptoms worsen or change in any way.

## 2023-02-13 ENCOUNTER — APPOINTMENT (OUTPATIENT)
Dept: ORTHOPEDIC SURGERY | Facility: CLINIC | Age: 48
End: 2023-02-13
Payer: COMMERCIAL

## 2023-02-13 VITALS
SYSTOLIC BLOOD PRESSURE: 120 MMHG | HEIGHT: 72 IN | DIASTOLIC BLOOD PRESSURE: 83 MMHG | BODY MASS INDEX: 20.99 KG/M2 | WEIGHT: 155 LBS

## 2023-02-13 PROCEDURE — 72070 X-RAY EXAM THORAC SPINE 2VWS: CPT

## 2023-02-13 PROCEDURE — 99214 OFFICE O/P EST MOD 30 MIN: CPT

## 2023-02-13 NOTE — PHYSICAL EXAM
[de-identified] : Lumbar Physical Exam\par \par Patient is walking well without any assistive devices\par \par Reflexes\par Patellar - normal\par Gastroc - normal\par Clonus - No\par \par Hip Exam - Normal\par \par Straight leg raise - none\par \par Pulses - 2+ dp/pt\par \par Range of motion - normal\par \par Sensation \par Sensation intact to light touch in L1, L2, L3, L4, L5 and S1 dermatomes bilaterally\par \par Motor\par 	IP	Quad	HS	TA	Gastroc	EHL\par Right	5/5	5/5	5/5	5/5	5/5	5/5\par Left	5/5	5/5	5/5	5/5	5/5	5/5\par \par Incision is clean dry and intact [de-identified] : Thoracic radiographs\par Hardware in appropriate position\par No acute complication

## 2023-02-13 NOTE — ASSESSMENT
[FreeTextEntry1] : This is a 46-year-old male that is now approximately 5 months out from his thoracic decompression fusion surgery.  Overall he has done remarkably well.  At this point I would encourage him to walk and do his activity daily living.  He should continue physical therapy/HEP.  I will see him back in 3 months.  Encouraged to reach out at any point if his symptoms worsen or change in any way.

## 2023-02-13 NOTE — HISTORY OF PRESENT ILLNESS
[de-identified] : 47 year old male who presents for follow-up evaluation 5 months s/p  thoracic decompression fusion procedure for epidural abscess/osteomyelitis discitis. Overall, patient has been progressing well. He has completed physical therapy and continued with home exercises, gym. He notes intermittent stiffness in the mid/ lower back with running. He is continuing follow up with infectious disease.

## 2023-02-13 NOTE — ADDENDUM
[FreeTextEntry1] : I, Kevin Shepard, acted solely as a scribe for Dr. Benji Balbuena MD on this date 02/13/2023  .\par  \par All medical record entries made by the Scribe were at my, Dr. Benji Balbuena MD., direction and personally dictated by me on 02/13/2023 . I have reviewed the chart and agree that the record accurately reflects my personal performance of the history, physical exam, assessment and plan. I have also personally directed, reviewed, and agreed with the chart.

## 2023-05-15 ENCOUNTER — APPOINTMENT (OUTPATIENT)
Dept: ORTHOPEDIC SURGERY | Facility: CLINIC | Age: 48
End: 2023-05-15
Payer: COMMERCIAL

## 2023-05-15 VITALS
WEIGHT: 155 LBS | BODY MASS INDEX: 20.99 KG/M2 | DIASTOLIC BLOOD PRESSURE: 78 MMHG | HEIGHT: 72 IN | SYSTOLIC BLOOD PRESSURE: 119 MMHG

## 2023-05-15 PROCEDURE — 72070 X-RAY EXAM THORAC SPINE 2VWS: CPT

## 2023-05-15 PROCEDURE — 99214 OFFICE O/P EST MOD 30 MIN: CPT

## 2023-05-15 NOTE — ASSESSMENT
[FreeTextEntry1] : This is a 46-year-old male that is now approximately 8 months out from his thoracic decompression fusion surgery.  Overall he has done remarkably well.  At this point I would encourage him to walk and do his activity daily living.  He should continue with his current home exercise program.  I will see him back in 4 months.  Encouraged to reach out at any point if his symptoms worsen or change in any way.

## 2023-05-15 NOTE — PHYSICAL EXAM
[de-identified] : Lumbar Physical Exam\par \par Patient is walking well without any assistive devices\par \par Reflexes\par Patellar - normal\par Gastroc - normal\par Clonus - No\par \par Hip Exam - Normal\par \par Straight leg raise - none\par \par Pulses - 2+ dp/pt\par \par Range of motion - normal\par \par Sensation \par Sensation intact to light touch in L1, L2, L3, L4, L5 and S1 dermatomes bilaterally\par \par Motor\par 	IP	Quad	HS	TA	Gastroc	EHL\par Right	5/5	5/5	5/5	5/5	5/5	5/5\par Left	5/5	5/5	5/5	5/5	5/5	5/5 [de-identified] : Thoracic radiographs\par Hardware in appropriate position\par No acute complications noted

## 2023-05-15 NOTE — HISTORY OF PRESENT ILLNESS
[de-identified] : 47 year old male who presents for follow-up evaluation of 8 months s/p thoracic decompression fusion procedure for epidural abscess/osteomyelitis discitis. Today, the patient reports overall he is pleased with his recovery to date. He reports he has been jogging about 3 times a week with minor discomfort. He reports he experiences pain when he sneezes that does not radiate and does not last longer than that second. \par \par 02/13/2023\par 47 year old male who presents for follow-up evaluation 5 months s/p  thoracic decompression fusion procedure for epidural abscess/osteomyelitis discitis. Overall, patient has been progressing well. He has completed physical therapy and continued with home exercises, gym. He notes intermittent stiffness in the mid/ lower back with running. He is continuing follow up with infectious disease.

## 2023-05-15 NOTE — ADDENDUM
[FreeTextEntry1] : I, Elizabeth Saxena, acted solely as a scribe for Dr. Benji Balbuena MD on this date 05/15/2023  \par \par All medical record entries made by the Scribe were at my, Dr. Benji Balbuena MD., direction and personally dictated by me on 05/15/2023 . I have reviewed the chart and agree that the record accurately reflects my personal performance of the history, physical exam, assessment and plan. I have also personally directed, reviewed, and agreed with the chart.

## 2023-06-24 NOTE — OCCUPATIONAL THERAPY INITIAL EVALUATION ADULT - SITTING BALANCE: STATIC
Abrasions on left eyebrow  Bilateral cheeks  Right patella  Left patella   Bilateral shoulder abrasions  Right inner humerus  Left posterial thigh.    Skin tear on right medial thigh.  Bruising on right axilla.           good balance

## 2023-09-25 ENCOUNTER — APPOINTMENT (OUTPATIENT)
Dept: ORTHOPEDIC SURGERY | Facility: CLINIC | Age: 48
End: 2023-09-25
Payer: COMMERCIAL

## 2023-09-25 VITALS
HEIGHT: 72 IN | DIASTOLIC BLOOD PRESSURE: 56 MMHG | WEIGHT: 155 LBS | SYSTOLIC BLOOD PRESSURE: 93 MMHG | BODY MASS INDEX: 20.99 KG/M2

## 2023-09-25 DIAGNOSIS — M54.2 CERVICALGIA: ICD-10-CM

## 2023-09-25 PROCEDURE — 99214 OFFICE O/P EST MOD 30 MIN: CPT

## 2023-09-25 PROCEDURE — 72070 X-RAY EXAM THORAC SPINE 2VWS: CPT

## 2023-10-30 ENCOUNTER — APPOINTMENT (OUTPATIENT)
Dept: ORTHOPEDIC SURGERY | Facility: CLINIC | Age: 48
End: 2023-10-30
Payer: COMMERCIAL

## 2023-10-30 VITALS — HEIGHT: 70 IN | BODY MASS INDEX: 22.48 KG/M2 | WEIGHT: 157 LBS

## 2023-10-30 DIAGNOSIS — M54.9 DORSALGIA, UNSPECIFIED: ICD-10-CM

## 2023-10-30 PROCEDURE — 99214 OFFICE O/P EST MOD 30 MIN: CPT

## 2024-03-15 NOTE — PHYSICAL THERAPY INITIAL EVALUATION ADULT - PHYSICAL ASSIST/NONPHYSICAL ASSIST: SIT/STAND, REHAB EVAL
Quality 226: Preventive Care And Screening: Tobacco Use: Screening And Cessation Intervention: Patient screened for tobacco use and is an ex/non-smoker Quality 131: Pain Assessment And Follow-Up: Pain assessment using a standardized tool is documented as negative, no follow-up plan required Detail Level: Detailed Quality 130: Documentation Of Current Medications In The Medical Record: Current Medications Documented Quality 110: Preventive Care And Screening: Influenza Immunization: Influenza Immunization previously received during influenza season Quality 431: Preventive Care And Screening: Unhealthy Alcohol Use - Screening: Patient screened for unhealthy alcohol use using a single question and scores less than 2 times per year Quality 111:Pneumonia Vaccination Status For Older Adults: Pneumococcal Vaccination Previously Received verbal cues/1 person assist

## 2024-08-07 ENCOUNTER — NON-APPOINTMENT (OUTPATIENT)
Age: 49
End: 2024-08-07

## 2024-08-08 ENCOUNTER — APPOINTMENT (OUTPATIENT)
Dept: UROLOGY | Facility: CLINIC | Age: 49
End: 2024-08-08

## 2024-08-08 PROCEDURE — G2211 COMPLEX E/M VISIT ADD ON: CPT

## 2024-08-08 PROCEDURE — 99203 OFFICE O/P NEW LOW 30 MIN: CPT

## 2024-08-08 NOTE — HISTORY OF PRESENT ILLNESS
[FreeTextEntry1] : He is a 48-year-old man who is seen today for initial visit.  Recently he noticed blood with ejaculation.  He otherwise has no other urological symptoms.  He believes that the PSA levels have been normal.  He does not complain of erectile dysfunction.  He is otherwise healthy.

## 2024-08-08 NOTE — ASSESSMENT
[FreeTextEntry1] : Hematospermia is generally a benign self-limited condition.  Hopefully it will resolve in the near future.  Rarely, if it persists may consider antibiotic therapy.  Persistent hematospermia may rarely be related to prostate cancer.  He will submit copy of his PSA level.  He can follow-up in the future if needed.

## 2024-08-08 NOTE — REVIEW OF SYSTEMS
[Negative] : Heme/Lymph [Loss of interest] : loss of interest in sexual activity [Sexually Transmitted Disease (STD)] : sexually transmitted disease [Wake up at night to urinate  How many times?  ___] : wakes up to urinate [unfilled] times during the night [FreeTextEntry1] : HPV

## 2024-08-08 NOTE — PHYSICAL EXAM
[General Appearance - Well Developed] : well developed [General Appearance - Well Nourished] : well nourished [Normal Appearance] : normal appearance [Well Groomed] : well groomed [Edema] : no peripheral edema [General Appearance - In No Acute Distress] : no acute distress [Respiration, Rhythm And Depth] : normal respiratory rhythm and effort [Exaggerated Use Of Accessory Muscles For Inspiration] : no accessory muscle use [Abdomen Tenderness] : non-tender [Abdomen Soft] : soft [Costovertebral Angle Tenderness] : no ~M costovertebral angle tenderness [Urethral Meatus] : meatus normal [Penis Abnormality] : normal circumcised penis [Urinary Bladder Findings] : the bladder was normal on palpation [Epididymis] : the epididymides were normal [Testes Tenderness] : no tenderness of the testes [Testes Mass (___cm)] : there were no testicular masses [Prostate Enlargement] : the prostate was not enlarged [Prostate Tenderness] : the prostate was not tender [No Prostate Nodules] : no prostate nodules [Normal Station and Gait] : the gait and station were normal for the patient's age [] : no rash [No Focal Deficits] : no focal deficits [Oriented To Time, Place, And Person] : oriented to person, place, and time [Affect] : the affect was normal [Mood] : the mood was normal [Not Anxious] : not anxious [Inguinal Lymph Nodes Enlarged Bilaterally] : inguinal

## (undated) DEVICE — SUT POLYSORB 2-0 36" GS-24 UNDYED

## (undated) DEVICE — GLV 8.5 PROTEXIS (WHITE)

## (undated) DEVICE — MIDAS REX LEGEND LUBRICANT DIFFUSER CARTRIDGE

## (undated) DEVICE — GLV 6.5 PROTEXIS (WHITE)

## (undated) DEVICE — Device

## (undated) DEVICE — BLADE SCALPEL SAFETYLOCK #10

## (undated) DEVICE — ELCTR BOVIE TIP BLADE INSULATED 2.75" EDGE

## (undated) DEVICE — DRAPE MAYO STAND 30"

## (undated) DEVICE — ELCTR HEX BLADE

## (undated) DEVICE — GLV 7.5 PROTEXIS (WHITE)

## (undated) DEVICE — NDL SPINAL 18G X 3.5" (PINK)

## (undated) DEVICE — GLV 9 DURAPRENE

## (undated) DEVICE — LAP PAD 18 X 18"

## (undated) DEVICE — SUT PROLENE 4-0 36" SH

## (undated) DEVICE — DRAPE IOBAN 23" X 23"

## (undated) DEVICE — BLADE SCALPEL SAFETYLOCK #11

## (undated) DEVICE — POSITIONER FOAM EGG CRATE ULNAR 2PCS (PINK)

## (undated) DEVICE — DRAPE EQUIPMENT BANDED BAG 30 X 30" (SHOWER CAP)

## (undated) DEVICE — GLV 8 PROTEXIS (WHITE)

## (undated) DEVICE — SUT SOFSILK 2-0 30" TIES

## (undated) DEVICE — SUT PDS II 1 54" TP-1

## (undated) DEVICE — SUT PLEDGET PRE PUNCH 4.8 X 9.5 X 1.5 MM

## (undated) DEVICE — MIDAS REX LEGEND BALL FLUTED LG BORE 6.0MM X 21CM

## (undated) DEVICE — DRAPE MAGNETIC INSTRUMENT MEDIUM

## (undated) DEVICE — DRAPE 1/2 SHEET 40X57"

## (undated) DEVICE — WARMING BLANKET LOWER ADULT

## (undated) DEVICE — VISITEC 4X4

## (undated) DEVICE — MIDAS REX LEGEND MATCH HEAD FLUTED LG BORE 3.0MM X 14CM

## (undated) DEVICE — SOL IRR POUR H2O 250ML

## (undated) DEVICE — ELCTR BOVIE TIP BLADE VALLEYLAB 6.5"

## (undated) DEVICE — ELCTR BIPOLAR CORD J&J 12FT DISP

## (undated) DEVICE — WARMING BLANKET UPPER ADULT

## (undated) DEVICE — PREP CHLORAPREP HI-LITE ORANGE 26ML

## (undated) DEVICE — BIPOLAR FORCEP SYMMETRY BAYONET 7" X 1.5MM SMOOTH (SILVER)

## (undated) DEVICE — DRAPE C ARM UNIVERSAL

## (undated) DEVICE — STRYKER BONE MILL BLADE FINE 3.2MM

## (undated) DEVICE — DRSG OPSITE 13.75 X 4"

## (undated) DEVICE — BIPOLAR FORCEP CODMAN VERSATRU 8" X 0.5MM (BLUE)

## (undated) DEVICE — VESSEL LOOP MAXI-RED  0.120" X 16"

## (undated) DEVICE — SUT SOFSILK 0 30" TIES

## (undated) DEVICE — DRAPE 3/4 SHEET W REINFORCEMENT 56X77"

## (undated) DEVICE — GOWN TRIMAX LG

## (undated) DEVICE — FOLEY TRAY 16FR 5CC LTX UMETER CLOSED

## (undated) DEVICE — SUT MAXON 1 96" T-60

## (undated) DEVICE — NDL COUNTER FOAM AND MAGNET 40-70

## (undated) DEVICE — SUT PROLENE 3-0 36" SH

## (undated) DEVICE — DRAPE TOWEL BLUE 17" X 24"

## (undated) DEVICE — STRYKER INTERPULSE HANDPIECE W IRR SUCTION TUBE

## (undated) DEVICE — ELCTR BOVIE TIP BLADE INSULATED 6.5" EDGE

## (undated) DEVICE — SUCTION YANKAUER NO CONTROL VENT

## (undated) DEVICE — VENODYNE/SCD SLEEVE CALF LARGE

## (undated) DEVICE — SYR ASEPTO

## (undated) DEVICE — DRSG STERISTRIPS 0.5 X 4"

## (undated) DEVICE — SPECIMEN CONTAINER 100ML

## (undated) DEVICE — BLADE SCALPEL SAFETYLOCK #15

## (undated) DEVICE — DRAPE INSTRUMENT POUCH 6.75" X 11"

## (undated) DEVICE — DRAPE LIGHT HANDLE COVER (BLUE)

## (undated) DEVICE — GLV 7 PROTEXIS (WHITE)

## (undated) DEVICE — MARKING PEN W RULER

## (undated) DEVICE — MEDICATION LABELS W MARKER

## (undated) DEVICE — STAPLER SKIN VISI-STAT 35 WIDE

## (undated) DEVICE — SYR LUER LOK 10CC

## (undated) DEVICE — DRSG TEGADERM 6"X8"

## (undated) DEVICE — SUT BIOSYN 4-0 18" P-12

## (undated) DEVICE — SOL IRR POUR NS 0.9% 500ML

## (undated) DEVICE — ELCTR BOVIE PENCIL HANDPIECE